# Patient Record
Sex: FEMALE | Race: WHITE | Employment: FULL TIME | URBAN - METROPOLITAN AREA
[De-identification: names, ages, dates, MRNs, and addresses within clinical notes are randomized per-mention and may not be internally consistent; named-entity substitution may affect disease eponyms.]

---

## 2017-01-18 ENCOUNTER — TELEPHONE (OUTPATIENT)
Dept: INTERNAL MEDICINE CLINIC | Facility: CLINIC | Age: 32
End: 2017-01-18

## 2017-01-18 RX ORDER — DOXYCYCLINE HYCLATE 100 MG/1
100 CAPSULE ORAL 2 TIMES DAILY
Qty: 60 CAPSULE | Refills: 2 | Status: SHIPPED | OUTPATIENT
Start: 2017-01-18 | End: 2017-05-10

## 2017-01-18 NOTE — TELEPHONE ENCOUNTER
Pt. Is calling to get a Rx for Doxycycline ph. # [phone]   [facility]       [phone]    Pt.  Is aware Dr. Kenneth Mccollum is off

## 2017-01-18 NOTE — TELEPHONE ENCOUNTER
Patient states her dermatologist has prescribed in the past, but they are not available. Patient takes intermittently--just when her skin breaks out. She believes she takes 100mg BID.  She is ok to wait until Dr. Hassan Blowers back in the office tomorrow if on call not

## 2017-01-18 NOTE — TELEPHONE ENCOUNTER
Imp- acne; Rec- doxycycline 100 mg po BID, #60, 2RF; ERx sent to Carlos Portillo in  [place] at [facility]; please call pt

## 2017-04-24 ENCOUNTER — TELEPHONE (OUTPATIENT)
Dept: INTERNAL MEDICINE CLINIC | Facility: CLINIC | Age: 32
End: 2017-04-24

## 2017-04-24 DIAGNOSIS — N30.00 ACUTE CYSTITIS WITHOUT HEMATURIA: Primary | ICD-10-CM

## 2017-04-24 RX ORDER — NITROFURANTOIN 25; 75 MG/1; MG/1
100 CAPSULE ORAL 2 TIMES DAILY
Qty: 10 CAPSULE | Refills: 0 | Status: SHIPPED | OUTPATIENT
Start: 2017-04-24 | End: 2017-04-29

## 2017-04-25 NOTE — TELEPHONE ENCOUNTER
Please call patient and let her know that the [facility] lab is open to 8 PM  p.m. tonight. ( Note to self:  Pt has sx of UTI with dysuria and frequency x 1 day. Will rx with Macrobid.   I discussed with her getting urine and urine culture prior to

## 2017-04-25 NOTE — TELEPHONE ENCOUNTER
Pt notified that lab orders entered  But pt will not be in town before 8 pm tonight  Will  Just start the macrobid tonight and will check back to  DR LUNA if still having sx after completing meds

## 2017-05-10 ENCOUNTER — OFFICE VISIT (OUTPATIENT)
Dept: INTERNAL MEDICINE CLINIC | Facility: CLINIC | Age: 32
End: 2017-05-10

## 2017-05-10 VITALS
TEMPERATURE: 99 F | WEIGHT: 122 LBS | SYSTOLIC BLOOD PRESSURE: 108 MMHG | DIASTOLIC BLOOD PRESSURE: 82 MMHG | OXYGEN SATURATION: 99 % | HEART RATE: 70 BPM | HEIGHT: 64 IN | BODY MASS INDEX: 20.83 KG/M2

## 2017-05-10 DIAGNOSIS — F32.A DEPRESSION, UNSPECIFIED DEPRESSION TYPE: Primary | ICD-10-CM

## 2017-05-10 PROCEDURE — 99212 OFFICE O/P EST SF 10 MIN: CPT | Performed by: INTERNAL MEDICINE

## 2017-05-10 PROCEDURE — 99213 OFFICE O/P EST LOW 20 MIN: CPT | Performed by: INTERNAL MEDICINE

## 2017-05-10 RX ORDER — VENLAFAXINE HYDROCHLORIDE 37.5 MG/1
CAPSULE, EXTENDED RELEASE ORAL
Qty: 60 CAPSULE | Refills: 0 | Status: SHIPPED | OUTPATIENT
Start: 2017-05-10 | End: 2017-05-11

## 2017-05-10 RX ORDER — CLINDAMYCIN PHOSPHATE AND BENZOYL PEROXIDE 10; 37.5 MG/G; MG/G
1 GEL TOPICAL
Refills: 1 | COMMUNITY
Start: 2017-04-04 | End: 2019-02-08

## 2017-05-10 RX ORDER — AMOXICILLIN 500 MG/1
CAPSULE ORAL
Refills: 1 | COMMUNITY
Start: 2017-04-04 | End: 2017-06-12

## 2017-05-10 NOTE — PROGRESS NOTES
Todd Lin is a 28year old female. Patient presents with:  Referral: Patient requests referral for therpaist. She was previously seeing one but would like to switch   Anxiety: Patient would like to talk about taking a medication for anxiety.  Feels % External Cream Apply 1 Application topically. Couple times per week Disp:  Rfl:    amoxicillin 500 MG Oral Cap TK ONE C PO  BID Disp:  Rfl: 1   ONEXTON 1.2-3.75 % External Gel Apply 1 Application topically.  Couple times per week  Disp:  Rfl: 1   Venlafax developed, well nourished, in no apparent distress  NECK: supple, no carotic bruits, no thyromegaly, no cervical or supraclavicular LAD  LUNGS: clear to auscultation bilaterally, no wheezing or rhonchi  CARDIO: RRR, normal S1S2, no gallops or murmurs

## 2017-05-11 ENCOUNTER — TELEPHONE (OUTPATIENT)
Dept: INTERNAL MEDICINE CLINIC | Facility: CLINIC | Age: 32
End: 2017-05-11

## 2017-05-11 RX ORDER — VENLAFAXINE HYDROCHLORIDE 75 MG/1
75 CAPSULE, EXTENDED RELEASE ORAL DAILY
Qty: 30 CAPSULE | Refills: 0 | Status: SHIPPED | OUTPATIENT
Start: 2017-05-11 | End: 2017-05-18

## 2017-05-11 NOTE — TELEPHONE ENCOUNTER
Spoke with pharmacist. Patient will take 37.5mg daily for 10 days, then will  the 75mg tablets to take daily.     Please notify patient that issue has been resolved, she can  the rx for 75mg daily, and start this after completing the 10 days o

## 2017-05-11 NOTE — TELEPHONE ENCOUNTER
Roel requesting PA Venlafaxine Er 37.5mg cap      On form no ID or number given form in purple folder

## 2017-05-11 NOTE — TELEPHONE ENCOUNTER
[phone]  Pt received 10 pills from pharmacy. She started it today. Pt states she can't stop taking meds once she starts it?  Please advise  To Rx

## 2017-05-11 NOTE — TELEPHONE ENCOUNTER
To ---we could have the pt find out what formulary alternatives are covered as I don't see that she has failed other med and that will be needed for PA process

## 2017-05-11 NOTE — TELEPHONE ENCOUNTER
Spoke with patient. She reports she went to the pharmacy last night to get medication and she was told that it is the 2 tabs daily that insurance was not approving. She is to start with 1 tab daily x 10 days and then increased to 2 tabs daily.  She also men

## 2017-05-12 NOTE — TELEPHONE ENCOUNTER
Relayed MD message to patient, Patient verbalized understanding. Patient will call if she has any further questions.

## 2017-05-17 ENCOUNTER — TELEPHONE (OUTPATIENT)
Dept: INTERNAL MEDICINE CLINIC | Facility: CLINIC | Age: 32
End: 2017-05-17

## 2017-05-17 NOTE — TELEPHONE ENCOUNTER
Recommend trial of Lexapro 10mg po qday. Like Effexor, it may take up to 4 weeks to see the full effect.   And like other anti-anxiety meds, she may notice some transient increase in her anxiety the first week, although not very common

## 2017-05-17 NOTE — TELEPHONE ENCOUNTER
Please advise for DR. LUNA patient - was put on Effexor by DR. DUNAWAY - patient does not tolerate . Was leandro dby DR. KNIGHT over the weekend to stop medication. Patient would like to start something else - to DR. Uribe Lamp

## 2017-05-17 NOTE — TELEPHONE ENCOUNTER
Pt was taken Effexor but she did not like how she felt ob it. On call doc told her to stop and call back Monday  is out this week she is asking if  can put her on something else.  Please advise

## 2017-05-18 RX ORDER — ESCITALOPRAM OXALATE 10 MG/1
10 TABLET ORAL DAILY
Qty: 30 TABLET | Refills: 6 | Status: SHIPPED | OUTPATIENT
Start: 2017-05-18 | End: 2017-05-19 | Stop reason: DRUGHIGH

## 2017-05-18 NOTE — TELEPHONE ENCOUNTER
Patient called back, she is concerned about the 10mg dosage of the Lexapro, patient states she is very sensitive to medications and would like to start by only taking 5mg of the Lexapro daily to see how she does on the lowest dosage.  Patient has not picked

## 2017-05-18 NOTE — TELEPHONE ENCOUNTER
Spoke to patient gave her dr. Saad Zepeda recommendation. Patient agreed to try lexapro sent to her local pharmacy.

## 2017-05-19 RX ORDER — ESCITALOPRAM OXALATE 5 MG/1
5 TABLET ORAL DAILY
COMMUNITY
End: 2017-07-06

## 2017-05-19 NOTE — TELEPHONE ENCOUNTER
Called Obdulia at [facility] cancelled Lexapro 10 mg, called in lexapro 5 mg  #30/ 3. LM relaying MD message to patient ( ok per Hipaa).

## 2017-06-12 ENCOUNTER — OFFICE VISIT (OUTPATIENT)
Dept: INTERNAL MEDICINE CLINIC | Facility: CLINIC | Age: 32
End: 2017-06-12

## 2017-06-12 VITALS
HEIGHT: 64 IN | WEIGHT: 123 LBS | DIASTOLIC BLOOD PRESSURE: 72 MMHG | HEART RATE: 60 BPM | TEMPERATURE: 98 F | SYSTOLIC BLOOD PRESSURE: 110 MMHG | BODY MASS INDEX: 21 KG/M2

## 2017-06-12 DIAGNOSIS — F32.A DEPRESSION, UNSPECIFIED DEPRESSION TYPE: ICD-10-CM

## 2017-06-12 DIAGNOSIS — F41.9 ANXIETY: ICD-10-CM

## 2017-06-12 DIAGNOSIS — R42 DIZZINESS: Primary | ICD-10-CM

## 2017-06-12 PROCEDURE — 99212 OFFICE O/P EST SF 10 MIN: CPT | Performed by: INTERNAL MEDICINE

## 2017-06-12 PROCEDURE — 99213 OFFICE O/P EST LOW 20 MIN: CPT | Performed by: INTERNAL MEDICINE

## 2017-06-13 NOTE — PROGRESS NOTES
Iva Pacheco is a 28year old female. Patient presents with: Follow - Up: switched effexor to lexapro followup also questions about taking ambien with her lexapro.       HPI:   Iva Pacheco is a 28year old female who presents for: follow up o 1   Sumatriptan-Naproxen Sodium  MG Oral Tab 1 tablet at onset of migraine, may repeat in 1 hour; no more than 2 tablets in 24 hours Disp: 18 tablet Rfl: 3   ondansetron 4 MG Oral Tablet Dispersible 1-2 tabs as needed for nausea associated with migra lexapro 5mg daily as long as blood tests are ok; would avoid ambien or take 1/2 tablet only while on lexapro. Also avoid maxalt and instead try ibuprofen or excedrin migraine instead.  Will place referral to [facility] navigator to help find a therapist.

## 2017-06-30 ENCOUNTER — TELEPHONE (OUTPATIENT)
Dept: INTERNAL MEDICINE CLINIC | Facility: CLINIC | Age: 32
End: 2017-06-30

## 2017-06-30 NOTE — TELEPHONE ENCOUNTER
She can try taking 1/2 tablet of lexapro 5mg daily, and let me know if this gets better. If not, we will discuss switching to another medication.

## 2017-06-30 NOTE — TELEPHONE ENCOUNTER
Pt. States she started Lexapro about 1 month ago. She has noticed she is more fatigued. She is asking if her dose should be lowered or change medication? She can be reached at [phone].

## 2017-07-06 ENCOUNTER — OFFICE VISIT (OUTPATIENT)
Dept: INTERNAL MEDICINE CLINIC | Facility: CLINIC | Age: 32
End: 2017-07-06

## 2017-07-06 VITALS
OXYGEN SATURATION: 98 % | DIASTOLIC BLOOD PRESSURE: 88 MMHG | WEIGHT: 124 LBS | SYSTOLIC BLOOD PRESSURE: 110 MMHG | TEMPERATURE: 98 F | BODY MASS INDEX: 21.17 KG/M2 | HEART RATE: 77 BPM | HEIGHT: 64 IN
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
## 2017-07-06 PROCEDURE — 99213 OFFICE O/P EST LOW 20 MIN: CPT | Performed by: INTERNAL MEDICINE

## 2017-07-06 PROCEDURE — 99212 OFFICE O/P EST SF 10 MIN: CPT | Performed by: INTERNAL MEDICINE

## 2017-07-06 RX ORDER — ESCITALOPRAM OXALATE 5 MG/1
5 TABLET ORAL DAILY
Qty: 90 TABLET | Refills: 1 | Status: SHIPPED | OUTPATIENT
Start: 2017-07-06 | End: 2017-10-02 | Stop reason: ALTCHOICE

## 2017-07-06 RX ORDER — DOXYCYCLINE HYCLATE 100 MG/1
100 CAPSULE ORAL 2 TIMES DAILY
Qty: 20 CAPSULE | Refills: 0 | Status: SHIPPED | OUTPATIENT
Start: 2017-07-06 | End: 2017-10-02 | Stop reason: ALTCHOICE

## 2017-07-09 ENCOUNTER — TELEPHONE (OUTPATIENT)
Dept: INTERNAL MEDICINE CLINIC | Facility: CLINIC | Age: 32
End: 2017-07-09

## 2017-07-09 NOTE — TELEPHONE ENCOUNTER
On call phone call Saturday (last evening 7/8/17 at 7 pm)-  Pt called regarding \"last couple days\" has pain and tension in lower neck and upper back. No fall or injury.    A wk ago, she cut back lexapro from 5 mg to 2.5 mg daily b/c was sleepy on 5 mg dos

## 2017-07-26 NOTE — PROGRESS NOTES
Kunal Carr is a 28year old female. Patient presents with:  Bite: Insect bite on left breast has gotten larger over the last week       HPI:   Kunal Carr is a 28year old female who presents for:bug bite    Reports insect bite on L breast papillomavirus infection    • H/O spinal fusion    • Hx of migraines    • Scoliosis       History reviewed. No pertinent surgical history.    Family History   Problem Relation Age of Onset   • Cancer Father      CLL      Social History:   Smoking status: Ne

## 2017-08-09 NOTE — TELEPHONE ENCOUNTER
Refill request for Vanesa Crockett. Appears to have last been entered on 3/10/2015 as historical.     Called patient for clarification. Pt reported that her gynecologist, Dr. Carly Garcia with [facility], normally prescribes this medication for her.  She has called their offic

## 2017-08-10 RX ORDER — DROSPIRENONE AND ETHINYL ESTRADIOL 0.02-3(28)
1 KIT ORAL DAILY
Qty: 3 PACKAGE | Refills: 3 | Status: SHIPPED | OUTPATIENT
Start: 2017-08-10 | End: 2018-08-10

## 2017-09-20 ENCOUNTER — TELEPHONE (OUTPATIENT)
Dept: INTERNAL MEDICINE CLINIC | Facility: CLINIC | Age: 32
End: 2017-09-20

## 2017-09-20 RX ORDER — CIPROFLOXACIN 250 MG/1
250 TABLET, FILM COATED ORAL 2 TIMES DAILY
Qty: 14 TABLET | Refills: 0 | Status: SHIPPED | OUTPATIENT
Start: 2017-09-20 | End: 2017-09-27

## 2017-09-20 NOTE — TELEPHONE ENCOUNTER
Please advise for DR. LUNA patient - called patient who states she has burning when urinating and frequency, denies fever and flank pain - allergy to sulfa - to DR. Juani Greenwood

## 2017-09-20 NOTE — TELEPHONE ENCOUNTER
Pt. Has a UTI & would like a Rx for it  Ph. # [phone]  [facility] ph.  # [phone]   Routed to clinical

## 2017-09-20 NOTE — TELEPHONE ENCOUNTER
Sent in rx for ciprofloxacin bid x 7 days. Take with food. Call if symptoms worsen or fail to improve within 48 hours.

## 2017-10-02 ENCOUNTER — OFFICE VISIT (OUTPATIENT)
Dept: INTERNAL MEDICINE CLINIC | Facility: CLINIC | Age: 32
End: 2017-10-02

## 2017-10-02 VITALS
SYSTOLIC BLOOD PRESSURE: 118 MMHG | WEIGHT: 125 LBS | DIASTOLIC BLOOD PRESSURE: 90 MMHG | BODY MASS INDEX: 21 KG/M2 | HEART RATE: 84 BPM | TEMPERATURE: 99 F | OXYGEN SATURATION: 98 %

## 2017-10-02 DIAGNOSIS — R51.9 NONINTRACTABLE EPISODIC HEADACHE, UNSPECIFIED HEADACHE TYPE: Primary | ICD-10-CM

## 2017-10-02 DIAGNOSIS — R11.0 NAUSEA: ICD-10-CM

## 2017-10-02 PROCEDURE — 99212 OFFICE O/P EST SF 10 MIN: CPT | Performed by: INTERNAL MEDICINE

## 2017-10-02 PROCEDURE — 99214 OFFICE O/P EST MOD 30 MIN: CPT | Performed by: INTERNAL MEDICINE

## 2017-10-02 NOTE — PROGRESS NOTES
HPI:   Julio Melo is a 28year old female presents with the following problems. Patient relates that about 3 weeks ago she started to have headaches. These are not like her migraine headaches. It is a diffuse headache.   This is associated wit Prescriptions:  Drospirenone-Ethinyl Estradiol (VESTURA) 3-0.02 MG Oral Tab Take 1 tablet by mouth daily. Disp: 3 Package Rfl: 3   Tazarotene (TAZORAC) 0.05 % External Cream Apply 1 Application topically. Couple times per week Disp:  Rfl:    ONEXTON 1.2-3. Anion Gap 10 0 - 18   BUN/CREA Ratio 10.7 10.0 - 20.0   Calculated Osmolality 291 275 - 295 mOsm/kg   GFR, Non-African American >60 >=60   GFR, -American >60 >=60   -LIPASE   Result Value Ref Range   Lipase 42 22 - 51 U/L   -CBC W/ DIFFERENTIAL normal  CARDIO:  RRR without murmur. S1 and S2 normal  GI:  good BS's. no masses, organomegaly or tenderness   EXTREMITIES:  no cyanosis, clubbing or edema  NEURO:  Awake and aware.   motor strength symmetric arms and legs,  reflexes 1 + knees  bilaterall

## 2017-10-03 ENCOUNTER — TELEPHONE (OUTPATIENT)
Dept: INTERNAL MEDICINE CLINIC | Facility: CLINIC | Age: 32
End: 2017-10-03

## 2017-10-03 ENCOUNTER — LAB ENCOUNTER (OUTPATIENT)
Dept: LAB | Age: 32
End: 2017-10-03
Attending: INTERNAL MEDICINE
Payer: COMMERCIAL

## 2017-10-03 DIAGNOSIS — R51.9 NONINTRACTABLE EPISODIC HEADACHE, UNSPECIFIED HEADACHE TYPE: ICD-10-CM

## 2017-10-03 DIAGNOSIS — R11.0 NAUSEA: ICD-10-CM

## 2017-10-03 PROCEDURE — 36415 COLL VENOUS BLD VENIPUNCTURE: CPT

## 2017-10-03 PROCEDURE — 80053 COMPREHEN METABOLIC PANEL: CPT

## 2017-10-03 PROCEDURE — 85025 COMPLETE CBC W/AUTO DIFF WBC: CPT

## 2017-10-03 NOTE — TELEPHONE ENCOUNTER
Relayed MD message to patient, Patient verbalized understanding. States she will have lab work completed today.

## 2017-10-03 NOTE — TELEPHONE ENCOUNTER
Please call patient today. I saw her yesterday. I left message for her last evening. I told her we would call today to confirm she received the message.   Please notify her that I did call the [facility] regarding the mothball and naphthalene Post-Care Instructions: I reviewed with the patient in detail post-care instructions. Patient is to wear sunprotection, and avoid picking at any of the treated lesions. Pt may apply Vaseline to crusted or scabbing areas.\\nWHAT TO EXPECT AFTER CRYOSURGERY (LIQUID NITROGEN) TREATMENT\\n\\n\\n Liquid Nitrogen is a very cold gas. In this liquid state it has a temperature of 320 degrees below zero Fahrenheit. Dermatologists use liquid nitrogen to treat certain skin growths such as warts, seborrheic and actinic keratoses, and a whole variety of other skin tumors. These skin growths are destroyed by the freezing action of this agent. With cryosurgery we have the advantage of being able to treat many skin growths and leave very little scarring. \\n\\n From a few hours to a few days after treatment, the area may blister, turn black, or form a scab. This is a desirable result. In some, no reaction is apparent.\\n\\n 1. You are allowed to get the area wet even immediately after treatment.\\n\\n 2. Most person have little or no pain from this treatment. You may have some swelling about the eyes, if cyrotherapy is performed on the forehead or temple.\\n\\n 3. It is not necessary to cover the area with a bandage. In fact, this is undesirable. Things heal better if left open to the air. You should protect the area from injury as much as possible. \\n\\n 4. Painful large blisters (even blisters filled with blood) can occur at times. These can be opened and the fluid drained out to relieve the pain. This may be done with a needle which has been cleaned with alcohol. \\n\\n 5. As the treated area heals the unwanted skin growth will fall off. This will take several days to weeks depending on the size and nature of the growth treated, the location on the skn and the way your body heals. \\n\\n 6. Allow the growth to fall off by itself - don't pick it or pull it off.\\n\\n 7. When the growth does come off, the skin underneath will be somewhat red. As time passes it will assume the color of normal skin. Don't bandage, pick at or apply any medications to the site after the growth has fallen off. The area may be sensitive to touch and be itchy as it heals. This is normal and it may take some time before it is exactly like the skin around it once more. \\n\\n\\n If you have any questions or concerns, please contact our office:         Vin[facility] [phone] Detail Level: Detailed Consent: The patient's consent was obtained including but not limited to risks of crusting, scabbing, blistering, scarring, darker or lighter pigmentary change, recurrence, incomplete removal and infection. Render Post-Care Instructions In Note?: yes Duration Of Freeze Thaw-Cycle (Seconds): 0

## 2017-10-04 ENCOUNTER — TELEPHONE (OUTPATIENT)
Dept: INTERNAL MEDICINE CLINIC | Facility: CLINIC | Age: 32
End: 2017-10-04

## 2017-10-04 DIAGNOSIS — R73.09 ELEVATED RANDOM BLOOD GLUCOSE LEVEL: Primary | ICD-10-CM

## 2017-10-04 NOTE — TELEPHONE ENCOUNTER
Discussed with patient. Chemistries acceptable. Blood sugar a little high but she was nonfasting. I will leave an order for fasting blood sugar that she can get by the end of the year. She did buy a CO2 detector for her home. No abnormalities.   Also t

## 2018-02-01 ENCOUNTER — TELEPHONE (OUTPATIENT)
Dept: INTERNAL MEDICINE CLINIC | Facility: CLINIC | Age: 33
End: 2018-02-01

## 2018-02-01 ENCOUNTER — APPOINTMENT (OUTPATIENT)
Dept: LAB | Age: 33
End: 2018-02-01
Attending: INTERNAL MEDICINE
Payer: COMMERCIAL

## 2018-02-01 DIAGNOSIS — R73.09 ELEVATED RANDOM BLOOD GLUCOSE LEVEL: ICD-10-CM

## 2018-02-01 LAB — GLUCOSE SERPL-MCNC: 85 MG/DL (ref 70–99)

## 2018-02-01 PROCEDURE — 82947 ASSAY GLUCOSE BLOOD QUANT: CPT

## 2018-02-01 PROCEDURE — 36415 COLL VENOUS BLD VENIPUNCTURE: CPT

## 2018-02-01 NOTE — TELEPHONE ENCOUNTER
Please let patient know that her blood sugar came out 85. This is completely normal.  This was for follow-up of a prior blood sugar that was a little bit elevated. This is very reassuring. I do not think any further testing is needed.

## 2018-03-15 NOTE — TELEPHONE ENCOUNTER
Please advise - called patient who states lexapro makes her really drowsy and tired , Effexor was even worse - patient wants to know if there is anything else she can try - to DR. LUNA

## 2018-03-15 NOTE — TELEPHONE ENCOUNTER
I will send referral.  Please asked patient her most dominant mood symptom whether it is more anxiety or depression or combination of both. Thank you.  ( I should have asked this before.)

## 2018-03-15 NOTE — TELEPHONE ENCOUNTER
Spoke with patient and relayed message from Dr. Elisa Nugent. She verbalized understanding and is open to referral to behavioral health. Referral pended.

## 2018-03-15 NOTE — TELEPHONE ENCOUNTER
Please notify patient also the 2 medicines are most familiar with. I can refer her to behavioral health if she agrees and they can help guide her on possible next steps.   I would send them the referral and they would reach out to her and call her first wi

## 2018-03-15 NOTE — TELEPHONE ENCOUNTER
Please call pt  Requesting recommendation for alternative to Lexapro  Pt uses [address] as pharmacy  Tasked to nursing

## 2018-04-09 ENCOUNTER — TELEPHONE (OUTPATIENT)
Dept: INTERNAL MEDICINE CLINIC | Facility: CLINIC | Age: 33
End: 2018-04-09

## 2018-04-09 NOTE — TELEPHONE ENCOUNTER
LMTCB  - LM pm VM that pt should check with insurance to make sure Dr. Kai Reid is in plan. Why is she seeing him?

## 2018-04-09 NOTE — TELEPHONE ENCOUNTER
Needs referral to Dr. Asher Potter. Endocrinologist from [facility]  Please fax to specialist at [phone].     Has appointment in July with specialist  Please call Lorenzo Rondon at [phone]  Routed to clinical

## 2018-05-02 NOTE — TELEPHONE ENCOUNTER
To Dr Emanuel Alcala attempted to reach out to patient x3 since April 9th and have not heard back from her.

## 2018-08-10 ENCOUNTER — TELEPHONE (OUTPATIENT)
Dept: INTERNAL MEDICINE CLINIC | Facility: CLINIC | Age: 33
End: 2018-08-10

## 2018-08-10 ENCOUNTER — OFFICE VISIT (OUTPATIENT)
Dept: INTERNAL MEDICINE CLINIC | Facility: CLINIC | Age: 33
End: 2018-08-10
Payer: COMMERCIAL

## 2018-08-10 VITALS
HEIGHT: 64 IN | OXYGEN SATURATION: 99 % | SYSTOLIC BLOOD PRESSURE: 116 MMHG | BODY MASS INDEX: 21.17 KG/M2 | HEART RATE: 64 BPM | WEIGHT: 124 LBS | TEMPERATURE: 99 F | DIASTOLIC BLOOD PRESSURE: 60 MMHG

## 2018-08-10 DIAGNOSIS — E78.5 HYPERLIPIDEMIA, UNSPECIFIED HYPERLIPIDEMIA TYPE: ICD-10-CM

## 2018-08-10 DIAGNOSIS — Z00.00 ANNUAL PHYSICAL EXAM: Primary | ICD-10-CM

## 2018-08-10 DIAGNOSIS — Z01.419 ENCOUNTER FOR GYNECOLOGICAL EXAMINATION WITHOUT ABNORMAL FINDING: Primary | ICD-10-CM

## 2018-08-10 DIAGNOSIS — E28.2 PCOS (POLYCYSTIC OVARIAN SYNDROME): ICD-10-CM

## 2018-08-10 DIAGNOSIS — R19.8 BOWEL MOVEMENT SYMPTOM: ICD-10-CM

## 2018-08-10 PROCEDURE — 99395 PREV VISIT EST AGE 18-39: CPT | Performed by: INTERNAL MEDICINE

## 2018-08-10 RX ORDER — CHLORAL HYDRATE 500 MG
2 CAPSULE ORAL DAILY
COMMUNITY
End: 2021-08-16

## 2018-08-10 NOTE — TELEPHONE ENCOUNTER
Called patient and gave number and address for DR. Jorge Gabriel ( gyn) [phone] - verbalized understanding

## 2018-08-10 NOTE — PROGRESS NOTES
HPI:   Yoselin Hood is a 33year old female presents with the following problems. Patient has no acute complaints. She is here for annual physical.    She struggles with acne. She is seen dermatology.   She is felt to have polycystic ovary syndr 124 lb (56.2 kg)    Body mass index is 21.28 kg/m². Current Outpatient Prescriptions:  Cholecalciferol (VITAMIN D-3) 5000 units Oral Tab Take 1 tablet by mouth daily. Disp:  Rfl:    Omega-3 1000 MG Oral Cap Take 2 tablets by mouth daily.  Disp:  Rfl: Voices no blurred vision or eye pain  HEENT:  Voices no nasal congestion, sinus pain or sore throat  LUNGS:  Voices no shortness of breath or cough  CARDIOVASCULAR:  Voices no chest pain or palpitations  GI:  Voices no abdominal pain, blood in stool, daniels celiac check. - CBC WITH DIFFERENTIAL WITH PLATELET; Future  - COMP METABOLIC PANEL (14); Future  - LIPID PANEL; Future  - ASSAY, THYROID STIM HORMONE; Future  - FREE T4 (FREE THYROXINE);  Future  - VITAMIN D, 25-HYDROXY; Future  - VITAMIN B12 WITH REFLEX

## 2018-08-10 NOTE — TELEPHONE ENCOUNTER
Please call patient and let her know that I forgot to give her phone number to gynecology. I cannot remember if she got it on the way out. In any case I wanted to refer her to Dr. Alie Soliman. I did place referral in the system.

## 2018-08-29 ENCOUNTER — LAB ENCOUNTER (OUTPATIENT)
Dept: LAB | Age: 33
End: 2018-08-29
Attending: INTERNAL MEDICINE
Payer: COMMERCIAL

## 2018-08-29 DIAGNOSIS — R19.8 BOWEL MOVEMENT SYMPTOM: ICD-10-CM

## 2018-08-29 DIAGNOSIS — Z00.00 ANNUAL PHYSICAL EXAM: ICD-10-CM

## 2018-08-29 DIAGNOSIS — E78.5 HYPERLIPIDEMIA, UNSPECIFIED HYPERLIPIDEMIA TYPE: ICD-10-CM

## 2018-08-29 LAB
25(OH)D3 SERPL-MCNC: 46.8 NG/ML
ALBUMIN SERPL BCP-MCNC: 4.4 G/DL (ref 3.5–4.8)
ALBUMIN/GLOB SERPL: 1.7 {RATIO} (ref 1–2)
ALP SERPL-CCNC: 74 U/L (ref 32–100)
ALT SERPL-CCNC: 16 U/L (ref 14–54)
ANION GAP SERPL CALC-SCNC: 7 MMOL/L (ref 0–18)
AST SERPL-CCNC: 22 U/L (ref 15–41)
BASOPHILS # BLD: 0 K/UL (ref 0–0.2)
BASOPHILS NFR BLD: 1 %
BILIRUB SERPL-MCNC: 0.6 MG/DL (ref 0.3–1.2)
BUN SERPL-MCNC: 8 MG/DL (ref 8–20)
BUN/CREAT SERPL: 11.1 (ref 10–20)
CALCIUM SERPL-MCNC: 9.4 MG/DL (ref 8.5–10.5)
CHLORIDE SERPL-SCNC: 105 MMOL/L (ref 95–110)
CHOLEST SERPL-MCNC: 176 MG/DL (ref 110–200)
CO2 SERPL-SCNC: 24 MMOL/L (ref 22–32)
CREAT SERPL-MCNC: 0.72 MG/DL (ref 0.5–1.5)
EOSINOPHIL # BLD: 0.1 K/UL (ref 0–0.7)
EOSINOPHIL NFR BLD: 2 %
ERYTHROCYTE [DISTWIDTH] IN BLOOD BY AUTOMATED COUNT: 13.7 % (ref 11–15)
GLOBULIN PLAS-MCNC: 2.6 G/DL (ref 2.5–3.7)
GLUCOSE SERPL-MCNC: 89 MG/DL (ref 70–99)
HBA1C MFR BLD: 5 % (ref 4–6)
HCT VFR BLD AUTO: 42.6 % (ref 35–48)
HDLC SERPL-MCNC: 75 MG/DL
HGB BLD-MCNC: 14.1 G/DL (ref 12–16)
IGA SERPL-MCNC: 146 MG/DL (ref 68–378)
LDLC SERPL CALC-MCNC: 95 MG/DL (ref 0–99)
LYMPHOCYTES # BLD: 1.3 K/UL (ref 1–4)
LYMPHOCYTES NFR BLD: 38 %
MCH RBC QN AUTO: 29.6 PG (ref 27–32)
MCHC RBC AUTO-ENTMCNC: 33.2 G/DL (ref 32–37)
MCV RBC AUTO: 89.2 FL (ref 80–100)
MONOCYTES # BLD: 0.3 K/UL (ref 0–1)
MONOCYTES NFR BLD: 8 %
NEUTROPHILS # BLD AUTO: 1.8 K/UL (ref 1.8–7.7)
NEUTROPHILS NFR BLD: 52 %
NONHDLC SERPL-MCNC: 101 MG/DL
OSMOLALITY UR CALC.SUM OF ELEC: 280 MOSM/KG (ref 275–295)
PATIENT FASTING: YES
PLATELET # BLD AUTO: 186 K/UL (ref 140–400)
PMV BLD AUTO: 7.7 FL (ref 7.4–10.3)
POTASSIUM SERPL-SCNC: 3.8 MMOL/L (ref 3.3–5.1)
PROT SERPL-MCNC: 7 G/DL (ref 5.9–8.4)
RBC # BLD AUTO: 4.77 M/UL (ref 3.7–5.4)
SODIUM SERPL-SCNC: 136 MMOL/L (ref 136–144)
T3FREE SERPL-MCNC: 3.36 PG/ML (ref 2.53–4.29)
T4 FREE SERPL-MCNC: 0.86 NG/DL (ref 0.58–1.64)
TRIGL SERPL-MCNC: 31 MG/DL (ref 1–149)
TSH SERPL-ACNC: 2.1 UIU/ML (ref 0.45–5.33)
TTG IGA SER-ACNC: 0.2 U/ML (ref ?–7)
VIT B12 SERPL-MCNC: 291 PG/ML (ref 181–914)
WBC # BLD AUTO: 3.5 K/UL (ref 4–11)

## 2018-08-29 PROCEDURE — 83516 IMMUNOASSAY NONANTIBODY: CPT

## 2018-08-29 PROCEDURE — 80061 LIPID PANEL: CPT

## 2018-08-29 PROCEDURE — 84481 FREE ASSAY (FT-3): CPT

## 2018-08-29 PROCEDURE — 83036 HEMOGLOBIN GLYCOSYLATED A1C: CPT

## 2018-08-29 PROCEDURE — 80053 COMPREHEN METABOLIC PANEL: CPT

## 2018-08-29 PROCEDURE — 84443 ASSAY THYROID STIM HORMONE: CPT

## 2018-08-29 PROCEDURE — 82784 ASSAY IGA/IGD/IGG/IGM EACH: CPT

## 2018-08-29 PROCEDURE — 84439 ASSAY OF FREE THYROXINE: CPT

## 2018-08-29 PROCEDURE — 83921 ORGANIC ACID SINGLE QUANT: CPT

## 2018-08-29 PROCEDURE — 36415 COLL VENOUS BLD VENIPUNCTURE: CPT

## 2018-08-29 PROCEDURE — 82607 VITAMIN B-12: CPT

## 2018-08-29 PROCEDURE — 82306 VITAMIN D 25 HYDROXY: CPT

## 2018-08-29 PROCEDURE — 85025 COMPLETE CBC W/AUTO DIFF WBC: CPT

## 2018-08-30 ENCOUNTER — TELEPHONE (OUTPATIENT)
Dept: INTERNAL MEDICINE CLINIC | Facility: CLINIC | Age: 33
End: 2018-08-30

## 2018-08-30 DIAGNOSIS — D70.8 OTHER NEUTROPENIA (HCC): Primary | ICD-10-CM

## 2018-08-30 NOTE — TELEPHONE ENCOUNTER
Called patient and relayed DR. LUNA message - verbalized understanding.  Copy of results and ordermailed to patients home

## 2018-08-31 LAB — MMA: 0.13 UMOL/L

## 2018-09-27 ENCOUNTER — LAB ENCOUNTER (OUTPATIENT)
Dept: LAB | Age: 33
End: 2018-09-27
Attending: INTERNAL MEDICINE
Payer: COMMERCIAL

## 2018-09-27 DIAGNOSIS — D70.8 OTHER NEUTROPENIA (HCC): ICD-10-CM

## 2018-09-27 LAB
BASOPHILS # BLD: 0 K/UL (ref 0–0.2)
BASOPHILS NFR BLD: 0 %
EOSINOPHIL # BLD: 0.1 K/UL (ref 0–0.7)
EOSINOPHIL NFR BLD: 1 %
ERYTHROCYTE [DISTWIDTH] IN BLOOD BY AUTOMATED COUNT: 13.7 % (ref 11–15)
HCT VFR BLD AUTO: 39.9 % (ref 35–48)
HGB BLD-MCNC: 13.5 G/DL (ref 12–16)
LYMPHOCYTES # BLD: 2.1 K/UL (ref 1–4)
LYMPHOCYTES NFR BLD: 37 %
MCH RBC QN AUTO: 29.9 PG (ref 27–32)
MCHC RBC AUTO-ENTMCNC: 33.9 G/DL (ref 32–37)
MCV RBC AUTO: 88.1 FL (ref 80–100)
MONOCYTES # BLD: 0.5 K/UL (ref 0–1)
MONOCYTES NFR BLD: 9 %
NEUTROPHILS # BLD AUTO: 2.9 K/UL (ref 1.8–7.7)
NEUTROPHILS NFR BLD: 53 %
PLATELET # BLD AUTO: 224 K/UL (ref 140–400)
PMV BLD AUTO: 8.5 FL (ref 7.4–10.3)
RBC # BLD AUTO: 4.52 M/UL (ref 3.7–5.4)
WBC # BLD AUTO: 5.5 K/UL (ref 4–11)

## 2018-09-27 PROCEDURE — 36415 COLL VENOUS BLD VENIPUNCTURE: CPT

## 2018-09-27 PROCEDURE — 85025 COMPLETE CBC W/AUTO DIFF WBC: CPT

## 2018-09-28 ENCOUNTER — TELEPHONE (OUTPATIENT)
Dept: INTERNAL MEDICINE CLINIC | Facility: CLINIC | Age: 33
End: 2018-09-28

## 2018-10-26 NOTE — TELEPHONE ENCOUNTER
Patient called to ask if Dr. Osorio Shall could put in a new referral to [facility] for her since she has new insurance now. Referral pended.

## 2018-10-30 ENCOUNTER — TELEPHONE (OUTPATIENT)
Dept: INTERNAL MEDICINE CLINIC | Facility: CLINIC | Age: 33
End: 2018-10-30

## 2018-10-30 RX ORDER — DOXYCYCLINE HYCLATE 100 MG/1
100 CAPSULE ORAL 2 TIMES DAILY
Qty: 20 CAPSULE | Refills: 0 | Status: SHIPPED | OUTPATIENT
Start: 2018-10-30 | End: 2018-11-06

## 2018-10-30 NOTE — TELEPHONE ENCOUNTER
Ok to call in doxycycline 100mg bid x 10 days; please make sure she is not pregnant (this is not safe in pregnancy). Also, if this does not improve in a few days, I would ask her to come see me.

## 2018-10-30 NOTE — TELEPHONE ENCOUNTER
I spoke with patient. She confirmed that she is not pregnant, she started her cycle today. [facility] She will call back to schedule an appointment if things are not improved in a few days.

## 2018-10-30 NOTE — TELEPHONE ENCOUNTER
Patient called because she has had a bug bite on her ankle that is not healing well. She said she had a similar looking bite and Dr. Stanislav Correia gave her something in the past that worked well. To Dr. Stanislav Correia.

## 2018-11-02 ENCOUNTER — HOSPITAL ENCOUNTER (OUTPATIENT)
Age: 33
Discharge: HOME | End: 2018-11-02
Attending: EMERGENCY MEDICINE
Payer: COMMERCIAL

## 2018-11-02 ENCOUNTER — TELEPHONE (OUTPATIENT)
Dept: INTERNAL MEDICINE CLINIC | Facility: CLINIC | Age: 33
End: 2018-11-02

## 2018-11-02 VITALS
OXYGEN SATURATION: 100 % | TEMPERATURE: 98 F | HEART RATE: 65 BPM | HEIGHT: 64 IN | DIASTOLIC BLOOD PRESSURE: 75 MMHG | BODY MASS INDEX: 20.49 KG/M2 | RESPIRATION RATE: 18 BRPM | WEIGHT: 120 LBS | SYSTOLIC BLOOD PRESSURE: 113 MMHG
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
## 2018-11-02 PROCEDURE — 99203 OFFICE O/P NEW LOW 30 MIN: CPT

## 2018-11-02 PROCEDURE — 99213 OFFICE O/P EST LOW 20 MIN: CPT

## 2018-11-02 RX ORDER — AMOXICILLIN AND CLAVULANATE POTASSIUM 875; 125 MG/1; MG/1
1 TABLET, FILM COATED ORAL 2 TIMES DAILY
Qty: 20 TABLET | Refills: 0 | Status: SHIPPED | OUTPATIENT
Start: 2018-11-02 | End: 2018-11-12

## 2018-11-02 RX ORDER — AMOXICILLIN AND CLAVULANATE POTASSIUM 875; 125 MG/1; MG/1
1 TABLET, FILM COATED ORAL 2 TIMES DAILY
Qty: 20 TABLET | Refills: 0 | Status: SHIPPED | OUTPATIENT
Start: 2018-11-02 | End: 2018-11-06

## 2018-11-03 NOTE — ED INITIAL ASSESSMENT (HPI)
Left ankle redness for 1 week, possible insect bite, pt called pmd and was given doxycycline, no fever, no drainage

## 2018-11-03 NOTE — ED PROVIDER NOTES
Patient Seen in: [address]    History   Patient presents with:  Rash Skin Problem (integumentary)    Stated Complaint: left ankle     HPI    This patient complains of a swollen skin lesion which is tender to the left ank the medial aspect over  the ankle that is 5 cm x 5 cm in size. There is a central pinpoint darkened area there is no expressible purulent drainage. There is no lymphangitis. The patient has intact pulses in the leg.   The patient can move the foot withou

## 2018-11-03 NOTE — TELEPHONE ENCOUNTER
On-call phone call Friday evening–[phone]. Bug bite near ankle. Getting worse. Main part is size of half dollar--was size of dime earlier this wk. Taking doxycycline since Tuesday (3 days ago). I recom pt go to [facility] care.   Pt expressed unders

## 2018-11-06 ENCOUNTER — OFFICE VISIT (OUTPATIENT)
Dept: INTERNAL MEDICINE CLINIC | Facility: CLINIC | Age: 33
End: 2018-11-06
Payer: COMMERCIAL

## 2018-11-06 ENCOUNTER — TELEPHONE (OUTPATIENT)
Dept: INTERNAL MEDICINE CLINIC | Facility: CLINIC | Age: 33
End: 2018-11-06

## 2018-11-06 VITALS
SYSTOLIC BLOOD PRESSURE: 110 MMHG | OXYGEN SATURATION: 99 % | WEIGHT: 124 LBS | BODY MASS INDEX: 21.17 KG/M2 | HEART RATE: 72 BPM | DIASTOLIC BLOOD PRESSURE: 88 MMHG | TEMPERATURE: 98 F | HEIGHT: 64 IN
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
## 2018-11-06 PROCEDURE — 99212 OFFICE O/P EST SF 10 MIN: CPT | Performed by: INTERNAL MEDICINE

## 2018-11-06 PROCEDURE — 99214 OFFICE O/P EST MOD 30 MIN: CPT | Performed by: INTERNAL MEDICINE

## 2018-11-06 NOTE — TELEPHONE ENCOUNTER
Patient called because she would like to be seen later today. Patient went to immediate care Friday night and was started on Augmentin for a bug bite on her leg. She says the area looks worse to her. It is red and swollen.  Dr. Rashmi Contreras, can you add patient to

## 2018-11-06 NOTE — TELEPHONE ENCOUNTER
Could she do around 1:00 - I'm trying to get out a little early today. Or Dr Summer Murdock - just has 13 pts today. Let me know. I'm happy to see her at 6:00 if that's the only time that works for her.

## 2018-11-06 NOTE — TELEPHONE ENCOUNTER
HIMANSHU with Dr. Megan Engel message. Requested call back with patient's thoughts. Called patient again to f/u. She reports she cannot make it to an earlier appt, but will try to make it here by 530 pm ayah to see Dr. Shyanne Marie. Scheduled in [facility].      To

## 2018-11-07 NOTE — PROGRESS NOTES
Jefry Lacy is a 33year old female. HPI:   She has had an unexplained sore just above the medial left ankle for the last 2 weeks. She attributes this to a bug bite but no direct evidence of this.  She was not camping, etc and no evidence for tick bi Take 1 tablet (5 mg total) by mouth nightly as needed for Sleep. Disp:  Rfl: 0   Albuterol Sulfate HFA (PROAIR HFA) 108 (90 BASE) MCG/ACT Inhalation Aero Soln Inhale 2 puffs into the lungs every 6 (six) hours as needed for Wheezing.  Disp: 1 Inhaler Rfl: 1 encounter  - suspected but not proven and infected with pus expressable. - AEROBIC BACTERIAL CULTURE; Future  - ANAEROBIC CULTURE; Future  - ANAEROBIC CULTURE  - AEROBIC BACTERIAL CULTURE    Continue Augmentin pending results of culture.  I want to rech

## 2018-11-15 ENCOUNTER — TELEPHONE (OUTPATIENT)
Dept: INTERNAL MEDICINE CLINIC | Facility: CLINIC | Age: 33
End: 2018-11-15

## 2018-11-15 NOTE — TELEPHONE ENCOUNTER
Please call pt, she has finished the medication that was prescribed for bug bite, area still red  Please call to discuss/advise, pt can be reached at [phone]  Tasked to nursing

## 2018-11-15 NOTE — TELEPHONE ENCOUNTER
To Dr. Cristal Duff - see below - sx have improved since drainage - swelling and pain decreased - redness is still size of dime. Surprised at how big it so for so long. Pt just switched insurance - it costs more to come for visit. Should she try another abx? Pt asking for result of culture which is negative - pt states she was on abx when culture taken - is it accurat?

## 2018-11-15 NOTE — TELEPHONE ENCOUNTER
Message noted. I think I  should see that before prescribing another antibiotic. I can see tomorrow if she wishes. I can make her Dr. visit a no charge as a courtesy but I am not sure how the hospital may bill her visit. We can ask Jet Barraza if we make her office visit a no charge if the provider based billing is also a no charge.

## 2018-11-16 ENCOUNTER — OFFICE VISIT (OUTPATIENT)
Dept: INTERNAL MEDICINE CLINIC | Facility: CLINIC | Age: 33
End: 2018-11-16
Payer: COMMERCIAL

## 2018-11-16 VITALS
WEIGHT: 125 LBS | SYSTOLIC BLOOD PRESSURE: 106 MMHG | DIASTOLIC BLOOD PRESSURE: 78 MMHG | HEIGHT: 64 IN | TEMPERATURE: 98 F | HEART RATE: 68 BPM | BODY MASS INDEX: 21.34 KG/M2

## 2018-11-16 DIAGNOSIS — T14.8XXA WOUND OF SKIN: Primary | ICD-10-CM

## 2018-11-16 RX ORDER — DOXYCYCLINE HYCLATE 100 MG/1
100 CAPSULE ORAL 2 TIMES DAILY
Qty: 14 CAPSULE | Refills: 0 | Status: SHIPPED | OUTPATIENT
Start: 2018-11-16 | End: 2019-02-08

## 2018-11-16 NOTE — PROGRESS NOTES
Deb Vargas is a 33year old female. HPI:   Patient presents with:  Wound: wound on left inner leg - non healing - was on antibiotics       For about 3 weeks or a little bit longer patient has had a left lower extremity medial leg wound.   She went t 5000 units Oral Tab Take 1 tablet by mouth daily. Disp:  Rfl:    Omega-3 1000 MG Oral Cap Take 2 tablets by mouth daily. Disp:  Rfl:    Tazarotene (TAZORAC) 0.05 % External Cream Apply 1 Application topically.  Couple times per week Disp:  Rfl:    ONEXTON 1 developed, well nourished, in no apparent distress  SKIN:  no rashes , no suspicious lesions  EYES:  PERRL. Sclera anicteric. LUNGS:  clear to auscultation. Effort normal  CARDIO:  RRR without murmur.    S1 and S2 normal  GI:  good BS's,  no masses,   EXT

## 2018-11-16 NOTE — TELEPHONE ENCOUNTER
S/w ilsa who states that as long as the physician doesn't put a charge for the visit the patient will not be billed. S/w patient who is agreeable to come in to see Dr. Nazanin Woodall (no charge)    To  to call tomorrow and schedule.

## 2018-11-16 NOTE — TELEPHONE ENCOUNTER
Sierra Luque is coming in today to see Dr. Jo Scott at 4 pm today. An appt has been scheduled with Dr. Pete Singh (Plastics) for 915am on Monday morning, Dr. Osorio Shall will discuss at todays visit.

## 2018-11-19 ENCOUNTER — OFFICE VISIT (OUTPATIENT)
Dept: SURGERY | Facility: CLINIC | Age: 33
End: 2018-11-19
Payer: COMMERCIAL

## 2018-11-19 ENCOUNTER — TELEPHONE (OUTPATIENT)
Dept: INTERNAL MEDICINE CLINIC | Facility: CLINIC | Age: 33
End: 2018-11-19

## 2018-11-19 DIAGNOSIS — S81.802A UNSPECIFIED OPEN WOUND, LEFT LOWER LEG, INITIAL ENCOUNTER: Primary | ICD-10-CM

## 2018-11-19 PROCEDURE — 99212 OFFICE O/P EST SF 10 MIN: CPT | Performed by: PLASTIC SURGERY

## 2018-11-19 PROCEDURE — 99243 OFF/OP CNSLTJ NEW/EST LOW 30: CPT | Performed by: PLASTIC SURGERY

## 2018-11-19 NOTE — PROGRESS NOTES
Per Dr. Lakeshia White applied polysporin to Left medial wound and covered with band aid vertically.

## 2018-11-20 NOTE — TELEPHONE ENCOUNTER
Called and Relayed MD's message to patient---verbalized understanding  Patient states Dr. Tristan Fisher instructed her to wait 2-3 weeks. She is to call his office if the area still looks pink. He will then refer her to derm.  Patient states she has a dermatol

## 2018-11-20 NOTE — TELEPHONE ENCOUNTER
Please call patient and let her know that I was able to see Dr. Rudolph Francois his report. I know she is started on some Polysporin ointment. She is to continue her doxycycline. Please ask her to call Friday and let me know how she is doing.   We can then de

## 2018-11-27 NOTE — TELEPHONE ENCOUNTER
To Dr. Antonio Jeronimo - pt has been using polysporin. Wound has been decreasing in size and is improving - \"definitely getting better\". Just using topical BID and \"I am very relieved\". Pt thanks you so much for seeing her before the holiday.

## 2019-01-07 ENCOUNTER — TELEPHONE (OUTPATIENT)
Dept: INTERNAL MEDICINE CLINIC | Facility: CLINIC | Age: 34
End: 2019-01-07

## 2019-01-07 DIAGNOSIS — L97.901: Primary | ICD-10-CM

## 2019-01-07 NOTE — TELEPHONE ENCOUNTER
Pt requesting a referral to dermatologist for her leg. Dr. Queenie Chávez recommended it.   Tasked to nursing

## 2019-02-04 ENCOUNTER — OFFICE VISIT (OUTPATIENT)
Dept: DERMATOLOGY CLINIC | Facility: CLINIC | Age: 34
End: 2019-02-04
Payer: COMMERCIAL

## 2019-02-04 DIAGNOSIS — L70.0 ACNE VULGARIS: Primary | ICD-10-CM

## 2019-02-04 DIAGNOSIS — L30.8 PSORIASIFORM DERMATITIS: ICD-10-CM

## 2019-02-04 PROCEDURE — 99212 OFFICE O/P EST SF 10 MIN: CPT | Performed by: DERMATOLOGY

## 2019-02-04 PROCEDURE — 99203 OFFICE O/P NEW LOW 30 MIN: CPT | Performed by: DERMATOLOGY

## 2019-02-04 RX ORDER — SPIRONOLACTONE 25 MG/1
25 TABLET ORAL 2 TIMES DAILY
Qty: 60 TABLET | Refills: 3 | Status: SHIPPED | OUTPATIENT
Start: 2019-02-04 | End: 2019-07-23

## 2019-02-04 RX ORDER — TRETINOIN 0.4 MG/G
GEL TOPICAL
Qty: 20 G | Refills: 2 | Status: SHIPPED | OUTPATIENT
Start: 2019-02-04 | End: 2021-01-25

## 2019-02-04 RX ORDER — CLOTRIMAZOLE AND BETAMETHASONE DIPROPIONATE 10; .64 MG/G; MG/G
CREAM TOPICAL
Qty: 45 G | Refills: 2 | Status: SHIPPED | OUTPATIENT
Start: 2019-02-04

## 2019-02-04 RX ORDER — CLINDAMYCIN AND BENZOYL PEROXIDE 10; 50 MG/G; MG/G
1 GEL TOPICAL 2 TIMES DAILY
Qty: 50 G | Refills: 12 | Status: SHIPPED | OUTPATIENT
Start: 2019-02-04 | End: 2019-03-06

## 2019-02-07 ENCOUNTER — TELEPHONE (OUTPATIENT)
Dept: DERMATOLOGY CLINIC | Facility: CLINIC | Age: 34
End: 2019-02-07

## 2019-02-07 RX ORDER — ALBUTEROL SULFATE 90 UG/1
2 AEROSOL, METERED RESPIRATORY (INHALATION) EVERY 6 HOURS PRN
Qty: 1 INHALER | Refills: 1 | Status: SHIPPED | OUTPATIENT
Start: 2019-02-07 | End: 2020-03-12

## 2019-02-07 NOTE — TELEPHONE ENCOUNTER
bisi Sewell pended for your review. Last sent in 2015. Andrés Joseph has a cold that started yesterday. Thank you.

## 2019-02-07 NOTE — TELEPHONE ENCOUNTER
Pt requesting refill for:  Albuterol  Previous inhaler has   Pt uses [address] as pharmacy  Tasked to Odalis Zarate

## 2019-02-07 NOTE — TELEPHONE ENCOUNTER
ramon in [place] [facility] [employer] [facility] ressend all 4 rx;s from Monday as [facility] not [facility].  pls advise when sent

## 2019-02-08 ENCOUNTER — OFFICE VISIT (OUTPATIENT)
Dept: INTERNAL MEDICINE CLINIC | Facility: CLINIC | Age: 34
End: 2019-02-08
Payer: COMMERCIAL

## 2019-02-08 VITALS
TEMPERATURE: 98 F | HEIGHT: 64 IN | BODY MASS INDEX: 21.14 KG/M2 | WEIGHT: 123.81 LBS | OXYGEN SATURATION: 98 % | RESPIRATION RATE: 12 BRPM | SYSTOLIC BLOOD PRESSURE: 102 MMHG | DIASTOLIC BLOOD PRESSURE: 68 MMHG | HEART RATE: 92 BPM

## 2019-02-08 DIAGNOSIS — J06.9 ACUTE UPPER RESPIRATORY INFECTION, UNSPECIFIED: Primary | ICD-10-CM

## 2019-02-08 DIAGNOSIS — R68.89 THROAT SYMPTOM: ICD-10-CM

## 2019-02-08 DIAGNOSIS — M41.9 SCOLIOSIS, UNSPECIFIED SCOLIOSIS TYPE, UNSPECIFIED SPINAL REGION: ICD-10-CM

## 2019-02-08 PROCEDURE — 99212 OFFICE O/P EST SF 10 MIN: CPT | Performed by: INTERNAL MEDICINE

## 2019-02-08 PROCEDURE — 99213 OFFICE O/P EST LOW 20 MIN: CPT | Performed by: INTERNAL MEDICINE

## 2019-02-08 RX ORDER — AZITHROMYCIN 250 MG/1
TABLET, FILM COATED ORAL
Qty: 6 TABLET | Refills: 0 | Status: SHIPPED | OUTPATIENT
Start: 2019-02-08 | End: 2019-07-23 | Stop reason: ALTCHOICE

## 2019-02-08 NOTE — PROGRESS NOTES
Brian August is a 29year old female. HPI:   Patient presents with:  Cough: States wet cough non-productive, difficulty breathing, unknown if has fever, pain with cough, body aches since 2/5/19       As per nursing documentation.   Starting February 5 Rfl:    Omega-3 1000 MG Oral Cap Take 2 tablets by mouth daily.  Disp:  Rfl:    Sumatriptan-Naproxen Sodium  MG Oral Tab 1 tablet at onset of migraine, may repeat in 1 hour; no more than 2 tablets in 24 hours Disp: 18 tablet Rfl: 3   ondansetron 4 MG cyanosis, clubbing or edema    Peak flow about 300 x 2. With her age and height she should be around 475. ASSESSMENT AND PLAN:     1. Acute upper respiratory infection, unspecified  Patient appears to have an acute upper respiratory infection.   For [facility]

## 2019-02-17 NOTE — PROGRESS NOTES
Darrius Shaffer is a 29year old female. Patient presents with:  Lesion: Pt presenting with lesion to L ankle. Pt states noticed 4/5 months prior. c/o non-healing and soreness.  Previously took doxycycline and augmentin for treatment and currently usin Tab Take 1 tablet by mouth every 6 (six) hours as needed for congestion.  Disp:  Rfl: 0      Past Medical History:   Diagnosis Date   • H/O human papillomavirus infection    • H/O spinal fusion    • Hx of migraines    • Scoliosis       Social History:  Soci RASH    Past Medical History:   Diagnosis Date   • H/O human papillomavirus infection    • H/O spinal fusion    • Hx of migraines    • Scoliosis      History reviewed. No pertinent surgical history.   Social History    Socioeconomic History      Marital Special Diet: Not Asked        Back Care: Not Asked        Exercise: Yes          walking twice a day        Bike Helmet: Not Asked        Seat Belt: Not Asked        Self-Exams: Not Asked        Left Handed: Not Asked        Right Handed: Yes        Cu nourished,oriented to person, place, time, and situation,in no apparent distress  HEENT: atraumatic, normocephalic  NECK: supple,no adenopathy,  EXTREMITIES: no cyanosis, clubbing or edema    SKIN:  multiple inflammatory papules,   nodules  , prominent atr Moisturizer. Monitor over the next several months. Anticipate will take 2-3 months to see any sort of difference in postinflammatory changes    If stable RTC 3-4 months or p.r.n. No orders of the defined types were placed in this encounter.       Meds

## 2019-02-22 ENCOUNTER — TELEPHONE (OUTPATIENT)
Dept: DERMATOLOGY CLINIC | Facility: CLINIC | Age: 34
End: 2019-02-22

## 2019-02-22 RX ORDER — CLINDAMYCIN AND BENZOYL PEROXIDE 10; 50 MG/G; MG/G
1 GEL TOPICAL 2 TIMES DAILY
Qty: 50 G | Refills: 12 | Status: SHIPPED | OUTPATIENT
Start: 2019-02-22 | End: 2020-12-31

## 2019-02-22 NOTE — TELEPHONE ENCOUNTER
Pt is agreeable to have prescriptions sent to [facility]. Rx sent. Pt will call back if costs are still to high to try the other options listed below.

## 2019-02-22 NOTE — TELEPHONE ENCOUNTER
LMTCB. Benzaclin can be sent to [facility] order pharmacy. Dr. Urias People, may we change retin-a prescription to Retin-a Micro 0.06% and send to [facility] [place] as well? Or another alternative?

## 2019-02-22 NOTE — TELEPHONE ENCOUNTER
Clindamycin Phos-Benzoyl Perox 1-5 % External Gel  Tretinoin Microsphere (RETIN-A MICRO PUMP) 0.04 % External Gel  Pt states medications are too expensive. Are there alternatives? Or cheaper medications?

## 2019-02-22 NOTE — TELEPHONE ENCOUNTER
Ok to change retin-a to the 0.06% and send both to [facility] [place] please let pt know.   ASPN would be alternative for Duac instead of the genreic benzaclin if this is still too much at [facility]--or [facility]ip[facility] at [facility] another alternative for the Benzaclin pended rx's  P

## 2019-07-23 ENCOUNTER — HOSPITAL ENCOUNTER (OUTPATIENT)
Dept: ULTRASOUND IMAGING | Facility: HOSPITAL | Age: 34
Discharge: HOME | End: 2019-07-23
Attending: INTERNAL MEDICINE
Payer: COMMERCIAL

## 2019-07-23 ENCOUNTER — OFFICE VISIT (OUTPATIENT)
Dept: INTERNAL MEDICINE CLINIC | Facility: CLINIC | Age: 34
End: 2019-07-23
Payer: COMMERCIAL

## 2019-07-23 ENCOUNTER — LAB ENCOUNTER (OUTPATIENT)
Dept: LAB | Facility: HOSPITAL | Age: 34
End: 2019-07-23
Attending: INTERNAL MEDICINE
Payer: COMMERCIAL

## 2019-07-23 VITALS
HEART RATE: 71 BPM | HEIGHT: 64 IN | WEIGHT: 121 LBS | SYSTOLIC BLOOD PRESSURE: 100 MMHG | OXYGEN SATURATION: 98 % | DIASTOLIC BLOOD PRESSURE: 70 MMHG | BODY MASS INDEX: 20.66 KG/M2 | TEMPERATURE: 99 F | RESPIRATION RATE: 20 BRPM

## 2019-07-23 DIAGNOSIS — R10.2 PELVIC PAIN IN FEMALE: ICD-10-CM

## 2019-07-23 DIAGNOSIS — R10.2 PELVIC PAIN IN FEMALE: Primary | ICD-10-CM

## 2019-07-23 LAB
ALBUMIN SERPL-MCNC: 4.2 G/DL (ref 3.4–5)
ALBUMIN/GLOB SERPL: 1.2 {RATIO} (ref 1–2)
ALP LIVER SERPL-CCNC: 70 U/L (ref 37–98)
ALT SERPL-CCNC: 14 U/L (ref 13–56)
ANION GAP SERPL CALC-SCNC: 6 MMOL/L (ref 0–18)
AST SERPL-CCNC: 11 U/L (ref 15–37)
B-HCG SERPL-ACNC: <1 MIU/ML
BASOPHILS # BLD AUTO: 0.04 X10(3) UL (ref 0–0.2)
BASOPHILS NFR BLD AUTO: 0.7 %
BILIRUB SERPL-MCNC: 0.5 MG/DL (ref 0.1–2)
BUN BLD-MCNC: 11 MG/DL (ref 7–18)
BUN/CREAT SERPL: 11.7 (ref 10–20)
CALCIUM BLD-MCNC: 8.9 MG/DL (ref 8.5–10.1)
CHLORIDE SERPL-SCNC: 108 MMOL/L (ref 98–112)
CO2 SERPL-SCNC: 27 MMOL/L (ref 21–32)
CREAT BLD-MCNC: 0.94 MG/DL (ref 0.55–1.02)
DEPRECATED RDW RBC AUTO: 38.8 FL (ref 35.1–46.3)
EOSINOPHIL # BLD AUTO: 0.08 X10(3) UL (ref 0–0.7)
EOSINOPHIL NFR BLD AUTO: 1.4 %
ERYTHROCYTE [DISTWIDTH] IN BLOOD BY AUTOMATED COUNT: 12.1 % (ref 11–15)
GLOBULIN PLAS-MCNC: 3.4 G/DL (ref 2.8–4.4)
GLUCOSE BLD-MCNC: 83 MG/DL (ref 70–99)
HCT VFR BLD AUTO: 41.5 % (ref 35–48)
HGB BLD-MCNC: 13.9 G/DL (ref 12–16)
IMM GRANULOCYTES # BLD AUTO: 0.01 X10(3) UL (ref 0–1)
IMM GRANULOCYTES NFR BLD: 0.2 %
LYMPHOCYTES # BLD AUTO: 2.6 X10(3) UL (ref 1–4)
LYMPHOCYTES NFR BLD AUTO: 46.5 %
M PROTEIN MFR SERPL ELPH: 7.6 G/DL (ref 6.4–8.2)
MCH RBC QN AUTO: 29.8 PG (ref 26–34)
MCHC RBC AUTO-ENTMCNC: 33.5 G/DL (ref 31–37)
MCV RBC AUTO: 88.9 FL (ref 80–100)
MONOCYTES # BLD AUTO: 0.43 X10(3) UL (ref 0.1–1)
MONOCYTES NFR BLD AUTO: 7.7 %
NEUTROPHILS # BLD AUTO: 2.43 X10 (3) UL (ref 1.5–7.7)
NEUTROPHILS # BLD AUTO: 2.43 X10(3) UL (ref 1.5–7.7)
NEUTROPHILS NFR BLD AUTO: 43.5 %
OSMOLALITY SERPL CALC.SUM OF ELEC: 291 MOSM/KG (ref 275–295)
PATIENT FASTING: YES
PLATELET # BLD AUTO: 214 10(3)UL (ref 150–450)
POTASSIUM SERPL-SCNC: 3.9 MMOL/L (ref 3.5–5.1)
RBC # BLD AUTO: 4.67 X10(6)UL (ref 3.8–5.3)
SODIUM SERPL-SCNC: 141 MMOL/L (ref 136–145)
TSI SER-ACNC: 1.63 MIU/ML (ref 0.36–3.74)
WBC # BLD AUTO: 5.6 X10(3) UL (ref 4–11)

## 2019-07-23 PROCEDURE — 93975 VASCULAR STUDY: CPT | Performed by: INTERNAL MEDICINE

## 2019-07-23 PROCEDURE — 76830 TRANSVAGINAL US NON-OB: CPT | Performed by: INTERNAL MEDICINE

## 2019-07-23 PROCEDURE — 84443 ASSAY THYROID STIM HORMONE: CPT

## 2019-07-23 PROCEDURE — 84702 CHORIONIC GONADOTROPIN TEST: CPT

## 2019-07-23 PROCEDURE — 99214 OFFICE O/P EST MOD 30 MIN: CPT | Performed by: INTERNAL MEDICINE

## 2019-07-23 PROCEDURE — 85025 COMPLETE CBC W/AUTO DIFF WBC: CPT

## 2019-07-23 PROCEDURE — 36415 COLL VENOUS BLD VENIPUNCTURE: CPT

## 2019-07-23 PROCEDURE — 76856 US EXAM PELVIC COMPLETE: CPT | Performed by: INTERNAL MEDICINE

## 2019-07-23 PROCEDURE — 80053 COMPREHEN METABOLIC PANEL: CPT

## 2019-07-23 RX ORDER — QUETIAPINE 25 MG/1
25 TABLET, FILM COATED ORAL NIGHTLY
Refills: 0 | COMMUNITY
Start: 2019-07-23 | End: 2020-07-30

## 2019-07-23 NOTE — PROGRESS NOTES
Brian August is a 29year old female. HPI:   Patient presents with:  Physical: Annual  Pain:  abdomen pain lower right quad comes and goes since friday     For 4 days patient has been having some right low pelvic pain.   Not really in the appendix are more than 2 tablets in 24 hours Disp: 18 tablet Rfl: 3   ondansetron 4 MG Oral Tablet Dispersible 1-2 tabs as needed for nausea associated with migraine; no more than 6 tabs in 24 hours Disp: 30 tablet Rfl: 1   Pseudoephedrine HCl (SUDAFED) 30 MG Oral Tab not done. EXTREMITIES : no cyanosis, clubbing or edema    ASSESSMENT AND PLAN:     1. Pelvic pain in female  Will go ahead and get pelvic ultrasound. Will check labs. Beta hCG.  CBC. Further recommendation to follow.   - CBC WITH DIFFERENTIAL WITH PLATE

## 2019-07-25 ENCOUNTER — TELEPHONE (OUTPATIENT)
Dept: INTERNAL MEDICINE CLINIC | Facility: CLINIC | Age: 34
End: 2019-07-25

## 2019-07-25 NOTE — TELEPHONE ENCOUNTER
To Dr. Anish Pritchett - pt states pain is about the same as yesterday - instructed pt to call if pain changes/increases, understanding verbalized.

## 2019-07-26 NOTE — TELEPHONE ENCOUNTER
Discussed with Layman Joseph. She still has the lower pelvic pain. There are no symptoms to suggest anything like  appendicitis. No fevers or chills. She did have a little bit of nausea. A little bit of gas after she ate something. Some bloating.   She got

## 2019-07-30 NOTE — TELEPHONE ENCOUNTER
Discussed with Pool Pierce. She is having some extra gas. Abdominal bloating. This usually happens after eating. Some constipation. Did take MiraLAX with some relief. She still has some lower pelvic area pain.   She had a follow-up with me Thursday but I

## 2019-07-30 NOTE — TELEPHONE ENCOUNTER
Patient will be keeping her appointment on Thursday, as she is still experiencing the pain. Wanted Dr. Grayce Eisenmenger to know.       Patient can be reached at:  [phone]

## 2019-07-31 ENCOUNTER — OFFICE VISIT (OUTPATIENT)
Dept: INTERNAL MEDICINE CLINIC | Facility: CLINIC | Age: 34
End: 2019-07-31
Payer: COMMERCIAL

## 2019-07-31 ENCOUNTER — TELEPHONE (OUTPATIENT)
Dept: INTERNAL MEDICINE CLINIC | Facility: CLINIC | Age: 34
End: 2019-07-31

## 2019-07-31 ENCOUNTER — APPOINTMENT (OUTPATIENT)
Dept: LAB | Facility: HOSPITAL | Age: 34
End: 2019-07-31
Attending: INTERNAL MEDICINE
Payer: COMMERCIAL

## 2019-07-31 ENCOUNTER — HOSPITAL ENCOUNTER (OUTPATIENT)
Dept: CT IMAGING | Facility: HOSPITAL | Age: 34
Discharge: HOME | End: 2019-07-31
Attending: INTERNAL MEDICINE
Payer: COMMERCIAL

## 2019-07-31 VITALS
HEIGHT: 64 IN | OXYGEN SATURATION: 98 % | WEIGHT: 120 LBS | TEMPERATURE: 99 F | RESPIRATION RATE: 16 BRPM | BODY MASS INDEX: 20.49 KG/M2 | DIASTOLIC BLOOD PRESSURE: 60 MMHG | SYSTOLIC BLOOD PRESSURE: 96 MMHG | HEART RATE: 67 BPM

## 2019-07-31 DIAGNOSIS — R10.31 CONTINUOUS RLQ ABDOMINAL PAIN: Primary | ICD-10-CM

## 2019-07-31 DIAGNOSIS — R30.0 DYSURIA: Primary | ICD-10-CM

## 2019-07-31 DIAGNOSIS — R10.33 PERIUMBILICAL PAIN: ICD-10-CM

## 2019-07-31 DIAGNOSIS — R30.0 DYSURIA: ICD-10-CM

## 2019-07-31 DIAGNOSIS — R10.31 CONTINUOUS RLQ ABDOMINAL PAIN: ICD-10-CM

## 2019-07-31 LAB
BILIRUB UR QL: NEGATIVE
CLARITY UR: CLEAR
COLOR UR: COLORLESS
GLUCOSE UR-MCNC: NEGATIVE MG/DL
LEUKOCYTE ESTERASE UR QL STRIP.AUTO: NEGATIVE
NITRITE UR QL STRIP.AUTO: NEGATIVE
PH UR: 7 [PH] (ref 5–8)
PROT UR-MCNC: NEGATIVE MG/DL
RBC #/AREA URNS AUTO: 0 /HPF
SP GR UR STRIP: 1 (ref 1–1.03)
UROBILINOGEN UR STRIP-ACNC: <2
VIT C UR-MCNC: NEGATIVE MG/DL
WBC #/AREA URNS AUTO: <1 /HPF

## 2019-07-31 PROCEDURE — 99213 OFFICE O/P EST LOW 20 MIN: CPT | Performed by: INTERNAL MEDICINE

## 2019-07-31 PROCEDURE — 74176 CT ABD & PELVIS W/O CONTRAST: CPT | Performed by: INTERNAL MEDICINE

## 2019-07-31 PROCEDURE — 81001 URINALYSIS AUTO W/SCOPE: CPT

## 2019-07-31 PROCEDURE — 87086 URINE CULTURE/COLONY COUNT: CPT

## 2019-07-31 NOTE — TELEPHONE ENCOUNTER
Please notify patient that her urine looked ok, just that she is dehydrated. Would really encouraged her to push more fluids. I will call her if her culture shows up positive. I would not pursue anything further at this time.

## 2019-07-31 NOTE — TELEPHONE ENCOUNTER
Discussed CT results with patient; will have her take miralax x 3 days, then take a fiber supplement daily. Also discussed bladder thickening-she has no symptoms; will check UA/culture.

## 2019-07-31 NOTE — TELEPHONE ENCOUNTER
Pt is calling to speak to Dr William Zabala, she has a question regarding instructions, [phone]     Tasked to nursing

## 2019-07-31 NOTE — PROGRESS NOTES
Lauryn Kim is a 29year old female. Patient presents with:  Abdominal Pain: Patient continues with lower abdominal pain. Last office visit with Dr Francine Wilkins on 7/23/19 and is present for evaluation if CAT is needed.  Pt states pain seems like it's gettin ondansetron 4 MG Oral Tablet Dispersible 1-2 tabs as needed for nausea associated with migraine; no more than 6 tabs in 24 hours Disp: 30 tablet Rfl: 1   Pseudoephedrine HCl (SUDAFED) 30 MG Oral Tab Take 1 tablet by mouth every 6 (six) hours as needed fo

## 2019-07-31 NOTE — TELEPHONE ENCOUNTER
Patient called back - she was wondering if the contrast made her dehydrated , her urine looks clear - RN explained that she should just drink water as usual nd the urine was fine so far - verbalized understanding

## 2019-08-01 ENCOUNTER — TELEPHONE (OUTPATIENT)
Dept: INTERNAL MEDICINE CLINIC | Facility: CLINIC | Age: 34
End: 2019-08-01

## 2019-08-01 NOTE — TELEPHONE ENCOUNTER
Pt called back looking for results and stated she has another question. Would not give anymore information than that.

## 2019-08-03 ENCOUNTER — TELEPHONE (OUTPATIENT)
Dept: INTERNAL MEDICINE CLINIC | Facility: CLINIC | Age: 34
End: 2019-08-03

## 2019-08-03 NOTE — TELEPHONE ENCOUNTER
Paged this morning regarding pain and bowel movements. Feels bloated after eating and has not has much success with miralax. Having small bowel movements. Could consider stopping miralax and starting benefiber, or taking both to see if sx improve.

## 2019-08-05 ENCOUNTER — TELEPHONE (OUTPATIENT)
Dept: INTERNAL MEDICINE CLINIC | Facility: CLINIC | Age: 34
End: 2019-08-05

## 2019-08-05 NOTE — TELEPHONE ENCOUNTER
Discussed with Layman Joseph. Reviewed symptoms. Patient still has \"pinching\" pain right pelvic area. She is wondering about a hernia. I told her I thought this was a good thought. I can have her see surgery.   I do not think she has a surgical abdomen but

## 2019-08-06 ENCOUNTER — OFFICE VISIT (OUTPATIENT)
Dept: INTERNAL MEDICINE CLINIC | Facility: CLINIC | Age: 34
End: 2019-08-06
Payer: COMMERCIAL

## 2019-08-06 VITALS
OXYGEN SATURATION: 98 % | HEART RATE: 61 BPM | TEMPERATURE: 98 F | RESPIRATION RATE: 14 BRPM | WEIGHT: 120 LBS | SYSTOLIC BLOOD PRESSURE: 110 MMHG | DIASTOLIC BLOOD PRESSURE: 72 MMHG | BODY MASS INDEX: 21 KG/M2

## 2019-08-06 DIAGNOSIS — K59.00 CONSTIPATION, UNSPECIFIED CONSTIPATION TYPE: ICD-10-CM

## 2019-08-06 DIAGNOSIS — R10.2 PELVIC PAIN: Primary | ICD-10-CM

## 2019-08-06 PROCEDURE — 99213 OFFICE O/P EST LOW 20 MIN: CPT | Performed by: INTERNAL MEDICINE

## 2019-08-06 RX ORDER — GARLIC EXTRACT 500 MG
1 CAPSULE ORAL DAILY
COMMUNITY
End: 2020-07-30

## 2019-08-06 NOTE — TELEPHONE ENCOUNTER
Pt is calling she is still having the same pinching pain in the abdomin  She scheduled an jarrett today at 4 with Dr Lamar Freitas, should she keep this or get a referral for a surgeon  Please call pt [phone]

## 2019-08-06 NOTE — PROGRESS NOTES
Jackie Leon is a 29year old female. HPI:   Patient presents with:  Abdominal Pain: Follow up on RLQ abdominal pain. Reports pain is pinching in nature and has had trouble working the past 2 days.       Patient continues to have right low pelvic pa MICRO PUMP) 0.06 % External Gel Apply 1 Application topically daily. Disp: 50 g Rfl: 11   Albuterol Sulfate HFA (PROAIR HFA) 108 (90 Base) MCG/ACT Inhalation Aero Soln Inhale 2 puffs into the lungs every 6 (six) hours as needed for Wheezing.  Disp: 1 Inhale in no apparent distress  SKIN:  no rashes , no suspicious lesions right groin area. HEENT: atraumatic. Pharynx normal without exudate. EYES:  PERRL. Sclera anicteric. NECK:  Supple,  no adenopathy,   LUNGS:  clear to auscultation.   Effort normal  CARDI

## 2019-08-12 NOTE — TELEPHONE ENCOUNTER
I would be careful with the dog having worms in the stool. Not likely that this is the cause of her issues, but would recommend keeping an eye on her stools just to be safe.

## 2019-08-12 NOTE — TELEPHONE ENCOUNTER
Per 8/6 OV note: Pelvic pain  Patient has persistent pelvic pain. Its been going on for couple weeks. Work-up so far has been negative. Pain is described as pinching and worse with sitting.   This may be either a soft tissue type pain or pinched nerve fr

## 2019-08-12 NOTE — TELEPHONE ENCOUNTER
Spoke to patient and relayed MD message and instructions, patient verbalizes understanding and agrees with instructions. Patent reports she would just like to recheck that her dog having worms in her stool is not any concern to her health.  She states s

## 2019-08-12 NOTE — TELEPHONE ENCOUNTER
Patient is using Benefiber but still having localized pain. She is also doing the Advil that Dr. Mandy Resendiz recommended. Patient is not seeing any improvement.       Patient can be reached at:  [phone]

## 2019-08-12 NOTE — TELEPHONE ENCOUNTER
Called and Relayed MD's message to patient---verbalized understanding.  She will monitor her stool's

## 2019-08-12 NOTE — TELEPHONE ENCOUNTER
As she is moving her bowels normally; this could be related to her back; would try heating pad and stretches to her lower back to see if helps. I would continue the advil (400mg up to 3 times daily as needed to see if this help).    Would consider trying a

## 2019-08-20 ENCOUNTER — TELEPHONE (OUTPATIENT)
Dept: INTERNAL MEDICINE CLINIC | Facility: CLINIC | Age: 34
End: 2019-08-20

## 2019-08-22 ENCOUNTER — TELEPHONE (OUTPATIENT)
Dept: INTERNAL MEDICINE CLINIC | Facility: CLINIC | Age: 34
End: 2019-08-22

## 2019-08-22 NOTE — TELEPHONE ENCOUNTER
Pt called as per Dr Sanders Aid request, to provide update on abdominal pain  Little improved but still there intermittently  Tasked to nursing

## 2019-08-26 NOTE — TELEPHONE ENCOUNTER
Discussed with Nima Hairston. Reviewed symptoms. Patient continues to have a pinching area in her right groin. She did see her gynecologist and the gynecologist was wondering about her hip or her bladder. However patient does not have any acute urological symptoms. She may urinate frequently but she drinks a lot of water. She has no new symptoms. No blood in the urine. No dysuria. Also with her hip it does not seem like she has any hip problem in itself. The pinching pain in the groin is worse with sitting and driving and better with walking. She has no difficulty with hip movement getting into the car. We will still wonder about her hernia. With that in mind she agrees to see Dr. Homar Ballard. This way he can review her physical exam findings and make recommendations onto possible causes for this persistent pain. She has tried nonsteroidal anti-inflammatories that have not helped. She had a unremarkable GYN exam.  ( Tere Patricia.  Miah Bear )

## 2019-09-01 ENCOUNTER — HOSPITAL ENCOUNTER (EMERGENCY)
Facility: HOSPITAL | Age: 34
Discharge: HOME | End: 2019-09-01
Attending: EMERGENCY MEDICINE
Payer: COMMERCIAL

## 2019-09-01 VITALS
RESPIRATION RATE: 17 BRPM | BODY MASS INDEX: 20.83 KG/M2 | DIASTOLIC BLOOD PRESSURE: 80 MMHG | OXYGEN SATURATION: 98 % | HEIGHT: 64 IN | TEMPERATURE: 98 F | WEIGHT: 122 LBS | SYSTOLIC BLOOD PRESSURE: 107 MMHG | HEART RATE: 81 BPM

## 2019-09-01 DIAGNOSIS — R10.2 PELVIC PAIN IN FEMALE: Primary | ICD-10-CM

## 2019-09-01 LAB
ANION GAP SERPL CALC-SCNC: 5 MMOL/L (ref 0–18)
B-HCG UR QL: NEGATIVE
BACTERIA UR QL AUTO: NEGATIVE /HPF
BASOPHILS # BLD AUTO: 0.03 X10(3) UL (ref 0–0.2)
BASOPHILS NFR BLD AUTO: 0.3 %
BILIRUB UR QL: NEGATIVE
BUN BLD-MCNC: 9 MG/DL (ref 7–18)
BUN/CREAT SERPL: 11 (ref 10–20)
CALCIUM BLD-MCNC: 8.8 MG/DL (ref 8.5–10.1)
CHLORIDE SERPL-SCNC: 106 MMOL/L (ref 98–112)
CLARITY UR: CLEAR
CO2 SERPL-SCNC: 30 MMOL/L (ref 21–32)
COLOR UR: COLORLESS
CREAT BLD-MCNC: 0.82 MG/DL (ref 0.55–1.02)
DEPRECATED RDW RBC AUTO: 38 FL (ref 35.1–46.3)
EOSINOPHIL # BLD AUTO: 0.05 X10(3) UL (ref 0–0.7)
EOSINOPHIL NFR BLD AUTO: 0.6 %
ERYTHROCYTE [DISTWIDTH] IN BLOOD BY AUTOMATED COUNT: 11.9 % (ref 11–15)
GLUCOSE BLD-MCNC: 104 MG/DL (ref 70–99)
GLUCOSE UR-MCNC: NEGATIVE MG/DL
HCT VFR BLD AUTO: 40.8 % (ref 35–48)
HGB BLD-MCNC: 14 G/DL (ref 12–16)
IMM GRANULOCYTES # BLD AUTO: 0.02 X10(3) UL (ref 0–1)
IMM GRANULOCYTES NFR BLD: 0.2 %
KETONES UR-MCNC: NEGATIVE MG/DL
LEUKOCYTE ESTERASE UR QL STRIP.AUTO: NEGATIVE
LYMPHOCYTES # BLD AUTO: 2.51 X10(3) UL (ref 1–4)
LYMPHOCYTES NFR BLD AUTO: 28.7 %
MCH RBC QN AUTO: 30.3 PG (ref 26–34)
MCHC RBC AUTO-ENTMCNC: 34.3 G/DL (ref 31–37)
MCV RBC AUTO: 88.3 FL (ref 80–100)
MONOCYTES # BLD AUTO: 0.41 X10(3) UL (ref 0.1–1)
MONOCYTES NFR BLD AUTO: 4.7 %
NEUTROPHILS # BLD AUTO: 5.73 X10 (3) UL (ref 1.5–7.7)
NEUTROPHILS # BLD AUTO: 5.73 X10(3) UL (ref 1.5–7.7)
NEUTROPHILS NFR BLD AUTO: 65.5 %
NITRITE UR QL STRIP.AUTO: NEGATIVE
OSMOLALITY SERPL CALC.SUM OF ELEC: 291 MOSM/KG (ref 275–295)
PH UR: 7 [PH] (ref 5–8)
PLATELET # BLD AUTO: 198 10(3)UL (ref 150–450)
POTASSIUM SERPL-SCNC: 3.3 MMOL/L (ref 3.5–5.1)
PROT UR-MCNC: NEGATIVE MG/DL
RBC # BLD AUTO: 4.62 X10(6)UL (ref 3.8–5.3)
RBC #/AREA URNS AUTO: <1 /HPF
SODIUM SERPL-SCNC: 141 MMOL/L (ref 136–145)
SP GR UR STRIP: 1 (ref 1–1.03)
UROBILINOGEN UR STRIP-ACNC: <2
VIT C UR-MCNC: NEGATIVE MG/DL
WBC # BLD AUTO: 8.8 X10(3) UL (ref 4–11)
WBC #/AREA URNS AUTO: <1 /HPF

## 2019-09-01 PROCEDURE — 81001 URINALYSIS AUTO W/SCOPE: CPT | Performed by: EMERGENCY MEDICINE

## 2019-09-01 PROCEDURE — 85025 COMPLETE CBC W/AUTO DIFF WBC: CPT | Performed by: EMERGENCY MEDICINE

## 2019-09-01 PROCEDURE — 80048 BASIC METABOLIC PNL TOTAL CA: CPT | Performed by: EMERGENCY MEDICINE

## 2019-09-01 PROCEDURE — 99283 EMERGENCY DEPT VISIT LOW MDM: CPT

## 2019-09-01 PROCEDURE — 81025 URINE PREGNANCY TEST: CPT

## 2019-09-01 PROCEDURE — 36415 COLL VENOUS BLD VENIPUNCTURE: CPT

## 2019-09-01 NOTE — ED INITIAL ASSESSMENT (HPI)
Pt c/o RLQ abd pain x6 weeks. She states she had US and CTs already done, which were negative, and has followed up with MD. Denies N/V/D.

## 2019-09-02 NOTE — ED PROVIDER NOTES
Patient Seen in: [facility] AND [facility] Emergency Department    History   Patient presents with:  Abdomen/Flank Pain (GI/)      HPI    Patient presents to the ED complaining of ongoing right pelvic pain for the past 6 weeks.   She has had several visits to h Resp 18   Temp 98.1 °F (36.7 °C)   Temp src Oral   SpO2 98 %   O2 Device None (Room air)       Physical Exam   Constitutional: She is oriented to person, place, and time. She appears well-developed and well-nourished. No distress.    HENT:   Head: Normoce individual orders.    RAINBOW DRAW BLUE   RAINBOW DRAW LAVENDER   RAINBOW DRAW LIGHT GREEN   RAINBOW DRAW GOLD   CBC W/ DIFFERENTIAL         Imaging Results Available and Reviewed while in ED:     ED Medications Administered: Medications - No data to displa

## 2019-09-02 NOTE — ED NOTES
Discharge instructions given to pt. Pt verbalized understanding of home care, pain control, and to follow up with surgical referral provided. Pt denied further questions or concerns. Pt ambulatory out of ED, discharged in stable condition.

## 2019-09-03 ENCOUNTER — PATIENT MESSAGE (OUTPATIENT)
Dept: INTERNAL MEDICINE CLINIC | Facility: CLINIC | Age: 34
End: 2019-09-03

## 2019-09-03 ENCOUNTER — OFFICE VISIT (OUTPATIENT)
Dept: INTERNAL MEDICINE CLINIC | Facility: CLINIC | Age: 34
End: 2019-09-03
Payer: COMMERCIAL

## 2019-09-03 ENCOUNTER — TELEPHONE (OUTPATIENT)
Dept: INTERNAL MEDICINE CLINIC | Facility: CLINIC | Age: 34
End: 2019-09-03

## 2019-09-03 VITALS
HEART RATE: 80 BPM | WEIGHT: 123 LBS | OXYGEN SATURATION: 99 % | DIASTOLIC BLOOD PRESSURE: 70 MMHG | TEMPERATURE: 99 F | BODY MASS INDEX: 21 KG/M2 | SYSTOLIC BLOOD PRESSURE: 98 MMHG | HEIGHT: 64 IN

## 2019-09-03 DIAGNOSIS — G89.29 CHRONIC RLQ PAIN: Primary | ICD-10-CM

## 2019-09-03 DIAGNOSIS — R10.31 CHRONIC RLQ PAIN: Primary | ICD-10-CM

## 2019-09-03 DIAGNOSIS — R10.31 RIGHT GROIN PAIN: ICD-10-CM

## 2019-09-03 PROCEDURE — 99213 OFFICE O/P EST LOW 20 MIN: CPT | Performed by: INTERNAL MEDICINE

## 2019-09-03 NOTE — TELEPHONE ENCOUNTER
From: Raya Adams  To: Kd Grey MD  Sent: 9/3/2019 11:28 AM CDT  Subject: Visit Follow-up Question    Hi Dr. Francine Wilkins,    I went to the ER on Saturday because my symptoms got worse over the weekend - I'm having pain now when I lie on my side, e

## 2019-09-03 NOTE — PROGRESS NOTES
Lee Chang is a 29year old female. HPI:   Patient presents with:  Pain: RLQ Abdominal Pain \"intermittent\". Pain has not improved over last 6 weeks. ER visit 9/1 for this. Pain now affecting sleep as well.       Patient continues to have right i Inhalation Aero Soln Inhale 2 puffs into the lungs every 6 (six) hours as needed for Wheezing.  Disp: 1 Inhaler Rfl: 1   Tretinoin Microsphere (RETIN-A MICRO PUMP) 0.04 % External Gel Use qhs for acne Disp: 20 g Rfl: 2   clotrimazole-betamethasone 1-0.05 % tenderness in the right groin area but I do not feel hernia. Her right lower quadrant abdominal exam is benign. No rebound or peritoneal signs. EXTREMITIES : no cyanosis, clubbing or edema    ASSESSMENT AND PLAN:     1.  Chronic RLQ pain  Right lower adriel

## 2019-09-04 PROBLEM — R10.31 RIGHT GROIN PAIN: Status: ACTIVE | Noted: 2019-09-04

## 2019-09-10 ENCOUNTER — TELEPHONE (OUTPATIENT)
Dept: INTERNAL MEDICINE CLINIC | Facility: CLINIC | Age: 34
End: 2019-09-10

## 2019-09-10 NOTE — TELEPHONE ENCOUNTER
Discussed with Luciano Ledesma. She will be seeing a spine surgeon to see if there is any relationship to her right inguinal \"nerve like\" pain. She will hold off on seeing uro-gynecologist for now. She will keep me informed of results of her consultations.   Ryan Loveless

## 2019-10-29 ENCOUNTER — OFFICE VISIT (OUTPATIENT)
Dept: UROLOGY | Facility: HOSPITAL | Age: 34
End: 2019-10-29
Attending: OBSTETRICS & GYNECOLOGY
Payer: COMMERCIAL

## 2019-10-29 VITALS
DIASTOLIC BLOOD PRESSURE: 60 MMHG | HEIGHT: 64 IN | BODY MASS INDEX: 21 KG/M2 | SYSTOLIC BLOOD PRESSURE: 102 MMHG | WEIGHT: 123 LBS

## 2019-10-29 DIAGNOSIS — R39.15 URINARY URGENCY: ICD-10-CM

## 2019-10-29 DIAGNOSIS — N81.84 PELVIC MUSCLE WASTING: Primary | ICD-10-CM

## 2019-10-29 DIAGNOSIS — R35.0 URINARY FREQUENCY: ICD-10-CM

## 2019-10-29 DIAGNOSIS — R10.9 ABDOMINAL WALL PAIN: ICD-10-CM

## 2019-10-29 PROCEDURE — 99202 OFFICE O/P NEW SF 15 MIN: CPT

## 2019-10-29 PROCEDURE — 87086 URINE CULTURE/COLONY COUNT: CPT | Performed by: OBSTETRICS & GYNECOLOGY

## 2019-10-29 NOTE — PATIENT INSTRUCTIONS
[facility]    TIMED VOIDING    For women experiencing urinary incontinence from overactive bladder or pelvic weakness, retraining the bladder is of great importance.   The bladder responds very well to this te suck on a hard candy or take small sips of water if you become thirsty instead of drinking large amounts. Diuretic medications such as high blood pressure medicine should be taken during the daytime if possible.     When you are able to hold your urine wit

## 2019-10-29 NOTE — PROGRESS NOTES
Simona Corey,   10/29/2019     Referred by Dr. Fredy Hwang  Pt here with self    She c/o right inguinal nerve pain x3 months, some improvement  Worse with sitting, improved while lying down    HPI:  Rare GAURAV  No UUI, some urinary urgency & frequency  Win Sodium  MG Oral Tab, 1 tablet at onset of migraine, may repeat in 1 hour; no more than 2 tablets in 24 hours, Disp: 18 tablet, Rfl: 3  ondansetron 4 MG Oral Tablet Dispersible, 1-2 tabs as needed for nausea associated with migraine; no more than 6 ta modification, discussed pharmacologic and nonpharmacologic mgmt options for urinary symptoms. Discussed dietary & weight management with potential improvements in symptoms with weight loss.     Diagnostic Items:  urine testing    Medications Discussed:  OAB

## 2020-01-29 ENCOUNTER — TELEPHONE (OUTPATIENT)
Dept: UROLOGY | Facility: HOSPITAL | Age: 35
End: 2020-01-29

## 2020-03-12 ENCOUNTER — TELEPHONE (OUTPATIENT)
Dept: INTERNAL MEDICINE CLINIC | Facility: CLINIC | Age: 35
End: 2020-03-12

## 2020-03-12 RX ORDER — ALBUTEROL SULFATE 90 UG/1
2 AEROSOL, METERED RESPIRATORY (INHALATION) EVERY 6 HOURS PRN
Qty: 1 INHALER | Refills: 11 | Status: SHIPPED | OUTPATIENT
Start: 2020-03-12

## 2020-03-16 ENCOUNTER — TELEPHONE (OUTPATIENT)
Dept: INTERNAL MEDICINE CLINIC | Facility: CLINIC | Age: 35
End: 2020-03-16

## 2020-03-16 NOTE — TELEPHONE ENCOUNTER
Discussed with Jazmyne Camargo. Will treat with Zyrtec and flonase. If worse we can use Zpack or short course of prednisone.

## 2020-03-16 NOTE — TELEPHONE ENCOUNTER
Please call pt  Has been having some post nasal drip, bit of a headache (possible congestion), bit of a sore throat, took Advil and Sudafed  Picked up inhaler Dr Bijan Lucio had prescribed, Pt was feeling a little bit of tightness, she has this sometimes when she has a cold  Please call pt to discuss if she is doing the right things  Tasked to nursing

## 2020-03-16 NOTE — TELEPHONE ENCOUNTER
Please advise - called patient who states sh ehas runny nose , postnasal drip, little bit of sore throat, she uses the inhaler she got but feels like she can,t really take a deep breath , feels burning in the chest, denies fever , did not travel , doesn't, know of any contact with anyone testing positive with COVID 19  - to DR. LUNA

## 2020-07-30 ENCOUNTER — OFFICE VISIT (OUTPATIENT)
Dept: INTERNAL MEDICINE CLINIC | Facility: CLINIC | Age: 35
End: 2020-07-30
Payer: COMMERCIAL

## 2020-07-30 VITALS
WEIGHT: 123 LBS | OXYGEN SATURATION: 98 % | HEART RATE: 80 BPM | BODY MASS INDEX: 21 KG/M2 | HEIGHT: 64 IN | SYSTOLIC BLOOD PRESSURE: 94 MMHG | DIASTOLIC BLOOD PRESSURE: 66 MMHG | TEMPERATURE: 98 F | RESPIRATION RATE: 16 BRPM

## 2020-07-30 DIAGNOSIS — R07.89 BURNING CHEST PAIN: ICD-10-CM

## 2020-07-30 DIAGNOSIS — G47.9 SLEEP DISORDER: ICD-10-CM

## 2020-07-30 DIAGNOSIS — J30.9 ALLERGIC RHINITIS, UNSPECIFIED SEASONALITY, UNSPECIFIED TRIGGER: Primary | ICD-10-CM

## 2020-07-30 DIAGNOSIS — F43.9 STRESS: ICD-10-CM

## 2020-07-30 DIAGNOSIS — E87.6 HYPOKALEMIA: ICD-10-CM

## 2020-07-30 DIAGNOSIS — R09.82 POSTNASAL DRIP: ICD-10-CM

## 2020-07-30 PROCEDURE — 99213 OFFICE O/P EST LOW 20 MIN: CPT | Performed by: INTERNAL MEDICINE

## 2020-07-30 PROCEDURE — 3078F DIAST BP <80 MM HG: CPT | Performed by: INTERNAL MEDICINE

## 2020-07-30 PROCEDURE — 3008F BODY MASS INDEX DOCD: CPT | Performed by: INTERNAL MEDICINE

## 2020-07-30 PROCEDURE — 3074F SYST BP LT 130 MM HG: CPT | Performed by: INTERNAL MEDICINE

## 2020-07-30 RX ORDER — ZOLPIDEM TARTRATE 5 MG/1
5 TABLET ORAL NIGHTLY PRN
Qty: 20 TABLET | Refills: 1 | Status: SHIPPED | OUTPATIENT
Start: 2020-07-30

## 2020-07-30 NOTE — PROGRESS NOTES
Lee Chang is a 36year old female. HPI:   Patient presents with:  Ear Problem: Patient c/o bilateral ear congestion, post nasal, wheezing, a little burning to chest, throat felt closing, and green/yellow mucus from nose for past 10 days.  Pt has puffs into the lungs every 6 (six) hours as needed for Wheezing. 1 Inhaler 11   • Tretinoin Microsphere (RETIN-A MICRO PUMP) 0.06 % External Gel Apply 1 Application topically daily.  50 g 11   • Tretinoin Microsphere (RETIN-A MICRO PUMP) 0.04 % External Gel lesions  HEENT: atraumatic. TM's normal. Canals clear. Pharynx normal without exudate. EYES:  PERRL. Sclera anicteric. NECK:  Supple,  no adenopathy,  thyroid normal  LUNGS:  clear to auscultation. Effort normal  CARDIO:  RRR without murmur.    S1 and

## 2020-11-16 ENCOUNTER — IMMUNIZATION (OUTPATIENT)
Dept: INTERNAL MEDICINE CLINIC | Facility: CLINIC | Age: 35
End: 2020-11-16
Payer: COMMERCIAL

## 2020-11-16 DIAGNOSIS — Z23 NEED FOR VACCINATION: ICD-10-CM

## 2020-11-16 PROCEDURE — 90471 IMMUNIZATION ADMIN: CPT | Performed by: INTERNAL MEDICINE

## 2020-11-16 PROCEDURE — 90686 IIV4 VACC NO PRSV 0.5 ML IM: CPT | Performed by: INTERNAL MEDICINE

## 2020-12-30 ENCOUNTER — TELEPHONE (OUTPATIENT)
Dept: INTERNAL MEDICINE CLINIC | Facility: CLINIC | Age: 35
End: 2020-12-30

## 2020-12-30 NOTE — TELEPHONE ENCOUNTER
Medications prescribed by derm Dr. Daniel Wright.      Mychart sent to patient to request refills from Dr. Sanchez Resides

## 2020-12-30 NOTE — TELEPHONE ENCOUNTER
Pt called, requesting refills for:  Retin-A  Clindamycin  Pt uses [facility] for these refills  Tasked to [employer]

## 2020-12-30 NOTE — TELEPHONE ENCOUNTER
Patient called    Asking for refill of Clindamycin Phos-Benzoyl Perox 1-5 % External Gel     LOV 2/4/2019

## 2020-12-31 RX ORDER — TRETINOIN 0.6 MG/G
1 GEL TOPICAL DAILY
Qty: 50 G | Refills: 0 | Status: SHIPPED | OUTPATIENT
Start: 2020-12-31

## 2020-12-31 RX ORDER — CLINDAMYCIN AND BENZOYL PEROXIDE 10; 50 MG/G; MG/G
1 GEL TOPICAL 2 TIMES DAILY
Qty: 50 G | Refills: 0 | Status: SHIPPED | OUTPATIENT
Start: 2020-12-31 | End: 2021-01-30

## 2021-01-25 ENCOUNTER — PATIENT MESSAGE (OUTPATIENT)
Dept: DERMATOLOGY CLINIC | Facility: CLINIC | Age: 36
End: 2021-01-25

## 2021-01-25 ENCOUNTER — VIRTUAL PHONE E/M (OUTPATIENT)
Dept: DERMATOLOGY CLINIC | Facility: CLINIC | Age: 36
End: 2021-01-25
Payer: COMMERCIAL

## 2021-01-25 DIAGNOSIS — L70.0 ACNE VULGARIS: Primary | ICD-10-CM

## 2021-01-25 PROCEDURE — 99212 OFFICE O/P EST SF 10 MIN: CPT | Performed by: DERMATOLOGY

## 2021-01-25 RX ORDER — AZELAIC ACID 0.15 G/G
GEL TOPICAL
Qty: 50 G | Refills: 6 | Status: SHIPPED | OUTPATIENT
Start: 2021-01-25

## 2021-01-25 NOTE — TELEPHONE ENCOUNTER
From: Raya Adams  To: Penelope Ha MD  Sent: 1/25/2021 1:54 PM CST  Subject: Non-Urgent Medical Question    Here are photos for our call. thanks!

## 2021-01-25 NOTE — PROGRESS NOTES
Virtual Telephone Check-In    Antoinette Schofield verbally consents to a Virtual/Telephone Check-In visit on 02/06/21. Patient has been referred to the [facility] website at [email or web address] to review the yearly Consent to Treat document.     Evaristo Simon zolpidem (AMBIEN) 5 MG Oral Tab Take 1 tablet (5 mg total) by mouth nightly as needed for Sleep.  20 tablet 1   • Albuterol Sulfate HFA (PROAIR HFA) 108 (90 Base) MCG/ACT Inhalation Aero Soln Inhale 2 puffs into the lungs every 6 (six) hours as needed for W Wheezing. 1 Inhaler 11   • Omega-3 1000 MG Oral Cap Take 2 tablets by mouth daily.      • clotrimazole-betamethasone 1-0.05 % External Cream Use bid to affected areas of skin (Patient not taking: Reported on 1/25/2021 ) 45 g 2   • Sumatriptan-Naproxen Sodiu Gets together: Not on file        Attends Yazidi service: Not on file        Active member of club or organization: Not on file        Attends meetings of clubs or organizations: Not on file        Relationship status: Not on file      Intimate partner changes scarring. Still more active areas on nose with redness. Does use Retin-A on spots BenzaClin on more active areas. Patient has been on new vaginal ring for her PCOS. Overall this has been reasonably stable. Nothing is really new or different. General skin care questions answered. Reassurance regarding benign skin lesions. Signs and symptoms of skin cancer, ABCDE's of melanoma briefly reviewed. Sunscreen use, sun protection, encouraged. Followup as noted in rtc or p.r.n.     The patient in

## 2021-04-01 ENCOUNTER — TELEPHONE (OUTPATIENT)
Dept: INTERNAL MEDICINE CLINIC | Facility: CLINIC | Age: 36
End: 2021-04-01

## 2021-08-16 ENCOUNTER — OFFICE VISIT (OUTPATIENT)
Dept: INTERNAL MEDICINE CLINIC | Facility: CLINIC | Age: 36
End: 2021-08-16
Payer: COMMERCIAL

## 2021-08-16 VITALS
TEMPERATURE: 99 F | SYSTOLIC BLOOD PRESSURE: 126 MMHG | HEART RATE: 80 BPM | BODY MASS INDEX: 23.15 KG/M2 | RESPIRATION RATE: 14 BRPM | WEIGHT: 135.63 LBS | OXYGEN SATURATION: 99 % | DIASTOLIC BLOOD PRESSURE: 82 MMHG | HEIGHT: 64 IN

## 2021-08-16 DIAGNOSIS — S16.1XXA NECK STRAIN, INITIAL ENCOUNTER: Primary | ICD-10-CM

## 2021-08-16 PROCEDURE — 3074F SYST BP LT 130 MM HG: CPT | Performed by: INTERNAL MEDICINE

## 2021-08-16 PROCEDURE — 3079F DIAST BP 80-89 MM HG: CPT | Performed by: INTERNAL MEDICINE

## 2021-08-16 PROCEDURE — 3008F BODY MASS INDEX DOCD: CPT | Performed by: INTERNAL MEDICINE

## 2021-08-16 PROCEDURE — 99214 OFFICE O/P EST MOD 30 MIN: CPT | Performed by: INTERNAL MEDICINE

## 2021-08-16 RX ORDER — CLINDAMYCIN AND BENZOYL PEROXIDE 10; 50 MG/G; MG/G
GEL TOPICAL
COMMUNITY
End: 2021-11-24

## 2021-08-16 RX ORDER — CETIRIZINE HYDROCHLORIDE 10 MG/1
10 TABLET ORAL DAILY
COMMUNITY

## 2021-08-16 NOTE — PROGRESS NOTES
Reid Rock is a 30year old female. HPI:   Patient presents with:  Checkup: Stiff Neck X 1 day , no trauma     Alcaraz Runner presents after having woken up at about 1:00 this morning with a stiff neck.   This is located on the right side of her neck and taking: Reported on 8/16/2021 ) 20 tablet 1   • clotrimazole-betamethasone 1-0.05 % External Cream Use bid to affected areas of skin (Patient not taking: Reported on 1/25/2021 ) 45 g 2   • ondansetron 4 MG Oral Tablet Dispersible 1-2 tabs as needed for kamila upper motor power bilaterally. 1+ biceps reflexes. ASSESSMENT AND PLAN:     1. Neck strain, initial encounter  I feel Austin Clemente has a neck strain of some sort. She is neurologically intact. We discussed options.   I asked her to take Advil 2 tablets 3 t

## 2021-11-16 ENCOUNTER — IMMUNIZATION (OUTPATIENT)
Dept: LAB | Facility: HOSPITAL | Age: 36
End: 2021-11-16
Attending: EMERGENCY MEDICINE
Payer: COMMERCIAL

## 2021-11-16 DIAGNOSIS — Z23 NEED FOR VACCINATION: Primary | ICD-10-CM

## 2021-11-16 PROCEDURE — 0064A SARSCOV2 VAC 50MCG/0.25ML IM: CPT

## 2021-11-23 ENCOUNTER — TELEPHONE (OUTPATIENT)
Dept: DERMATOLOGY CLINIC | Facility: CLINIC | Age: 36
End: 2021-11-23

## 2021-11-23 RX ORDER — CLINDAMYCIN AND BENZOYL PEROXIDE 10; 50 MG/G; MG/G
GEL TOPICAL
Qty: 50 G | Refills: 0 | OUTPATIENT
Start: 2021-11-23

## 2021-11-23 NOTE — TELEPHONE ENCOUNTER
Patient called    Asking for refill of Clindamycin Phos-Benzoyl Perox 1-5 % External Gel    LOV - video visit 1/25/21  Scheduled- 1/6/22

## 2021-11-24 RX ORDER — CLINDAMYCIN AND BENZOYL PEROXIDE 10; 50 MG/G; MG/G
GEL TOPICAL
Qty: 50 G | Refills: 0 | Status: SHIPPED | OUTPATIENT
Start: 2021-11-24

## 2021-12-01 NOTE — TELEPHONE ENCOUNTER
Fax from [address] [place] [address] fax in pa inbox regarding Retin-A Micro Pump 0.06% gel

## 2021-12-03 NOTE — TELEPHONE ENCOUNTER
Fax from [facility]    Approved for Retin-A micro pump 0.06% gel w/pump    11/2/21-12/1/24    Placed fax in pa inbox

## 2021-12-03 NOTE — TELEPHONE ENCOUNTER
Contacted [facility], verified information and updated on approval.  Await confirmation fax from [place].

## 2021-12-13 NOTE — TELEPHONE ENCOUNTER
Fax from [address] form requested for Retin-A Micro Pump Gel W/pump    Placed fax in pa inbox

## 2021-12-14 ENCOUNTER — HOSPITAL ENCOUNTER (EMERGENCY)
Age: 36
Discharge: HOME | End: 2021-12-14
Attending: EMERGENCY MEDICINE

## 2021-12-14 ENCOUNTER — APPOINTMENT (OUTPATIENT)
Dept: GENERAL RADIOLOGY | Age: 36
End: 2021-12-14
Attending: EMERGENCY MEDICINE

## 2021-12-14 VITALS
HEART RATE: 66 BPM | DIASTOLIC BLOOD PRESSURE: 66 MMHG | SYSTOLIC BLOOD PRESSURE: 110 MMHG | RESPIRATION RATE: 14 BRPM | OXYGEN SATURATION: 98 % | WEIGHT: 138.23 LBS | TEMPERATURE: 98 F

## 2021-12-14 DIAGNOSIS — M25.571 ACUTE RIGHT ANKLE PAIN: Primary | ICD-10-CM

## 2021-12-14 PROCEDURE — 29515 APPLICATION SHORT LEG SPLINT: CPT

## 2021-12-14 PROCEDURE — 10002803 HB RX 637: Performed by: EMERGENCY MEDICINE

## 2021-12-14 PROCEDURE — 10004651 HB RX, NO CHARGE ITEM: Performed by: EMERGENCY MEDICINE

## 2021-12-14 PROCEDURE — 99283 EMERGENCY DEPT VISIT LOW MDM: CPT

## 2021-12-14 PROCEDURE — 73610 X-RAY EXAM OF ANKLE: CPT

## 2021-12-14 RX ORDER — ACETAMINOPHEN 500 MG
1000 TABLET ORAL ONCE
Status: COMPLETED | OUTPATIENT
Start: 2021-12-14 | End: 2021-12-14

## 2021-12-14 RX ORDER — HYDROCODONE BITARTRATE AND ACETAMINOPHEN 5; 325 MG/1; MG/1
1 TABLET ORAL EVERY 6 HOURS PRN
Qty: 10 TABLET | Refills: 0 | Status: SHIPPED | OUTPATIENT
Start: 2021-12-14 | End: 2023-06-28 | Stop reason: ALTCHOICE

## 2021-12-14 RX ORDER — IBUPROFEN 600 MG/1
TABLET ORAL
Status: DISCONTINUED
Start: 2021-12-14 | End: 2021-12-15 | Stop reason: HOSPADM

## 2021-12-14 RX ORDER — DOCUSATE SODIUM 100 MG/1
100 CAPSULE, LIQUID FILLED ORAL 2 TIMES DAILY PRN
Qty: 20 CAPSULE | Refills: 0 | Status: SHIPPED | OUTPATIENT
Start: 2021-12-14 | End: 2021-12-14 | Stop reason: SDUPTHER

## 2021-12-14 RX ORDER — ACETAMINOPHEN 325 MG/1
650 TABLET ORAL EVERY 6 HOURS PRN
Qty: 30 TABLET | Refills: 0 | Status: SHIPPED | OUTPATIENT
Start: 2021-12-14 | End: 2021-12-14 | Stop reason: SDUPTHER

## 2021-12-14 RX ORDER — ACETAMINOPHEN 325 MG/1
650 TABLET ORAL EVERY 6 HOURS PRN
Qty: 30 TABLET | Refills: 0 | Status: SHIPPED | OUTPATIENT
Start: 2021-12-14

## 2021-12-14 RX ORDER — IBUPROFEN 600 MG/1
600 TABLET ORAL ONCE
Status: COMPLETED | OUTPATIENT
Start: 2021-12-14 | End: 2021-12-14

## 2021-12-14 RX ORDER — HYDROCODONE BITARTRATE AND ACETAMINOPHEN 5; 325 MG/1; MG/1
1 TABLET ORAL EVERY 6 HOURS PRN
Qty: 10 TABLET | Refills: 0 | Status: SHIPPED | OUTPATIENT
Start: 2021-12-14 | End: 2021-12-14 | Stop reason: SDUPTHER

## 2021-12-14 RX ORDER — DOCUSATE SODIUM 100 MG/1
100 CAPSULE, LIQUID FILLED ORAL 2 TIMES DAILY PRN
Qty: 20 CAPSULE | Refills: 0 | Status: SHIPPED | OUTPATIENT
Start: 2021-12-14 | End: 2023-06-28 | Stop reason: ALTCHOICE

## 2021-12-14 RX ORDER — IBUPROFEN 600 MG/1
600 TABLET ORAL EVERY 6 HOURS PRN
Qty: 20 TABLET | Refills: 0 | Status: SHIPPED | OUTPATIENT
Start: 2021-12-14 | End: 2021-12-14 | Stop reason: SDUPTHER

## 2021-12-14 RX ORDER — ACETAMINOPHEN 500 MG
TABLET ORAL
Status: DISCONTINUED
Start: 2021-12-14 | End: 2021-12-15 | Stop reason: HOSPADM

## 2021-12-14 RX ORDER — IBUPROFEN 600 MG/1
600 TABLET ORAL EVERY 6 HOURS PRN
Qty: 20 TABLET | Refills: 0 | Status: SHIPPED | OUTPATIENT
Start: 2021-12-14

## 2021-12-14 RX ADMIN — IBUPROFEN 600 MG: 600 TABLET ORAL at 20:55

## 2021-12-14 RX ADMIN — ACETAMINOPHEN 1000 MG: 500 TABLET ORAL at 20:54

## 2021-12-14 ASSESSMENT — ENCOUNTER SYMPTOMS
CHILLS: 0
NUMBNESS: 0
WEAKNESS: 0
FEVER: 0
FATIGUE: 0

## 2021-12-15 ENCOUNTER — TELEPHONE (OUTPATIENT)
Dept: SPORTS MEDICINE | Age: 36
End: 2021-12-15

## 2021-12-15 ENCOUNTER — OFFICE VISIT (OUTPATIENT)
Dept: SPORTS MEDICINE | Age: 36
End: 2021-12-15

## 2021-12-15 VITALS
HEIGHT: 64 IN | BODY MASS INDEX: 23.56 KG/M2 | DIASTOLIC BLOOD PRESSURE: 97 MMHG | WEIGHT: 138 LBS | SYSTOLIC BLOOD PRESSURE: 144 MMHG

## 2021-12-15 DIAGNOSIS — S99.911A INJURY OF RIGHT ANKLE, INITIAL ENCOUNTER: ICD-10-CM

## 2021-12-15 DIAGNOSIS — M25.871 IMPINGEMENT SYNDROME OF RIGHT ANKLE: Primary | ICD-10-CM

## 2021-12-15 DIAGNOSIS — Q68.8 OS TRIGONUM SYNDROME: ICD-10-CM

## 2021-12-15 DIAGNOSIS — M25.671 DECREASED RANGE OF MOTION OF RIGHT ANKLE: ICD-10-CM

## 2021-12-15 PROCEDURE — 99204 OFFICE O/P NEW MOD 45 MIN: CPT | Performed by: FAMILY MEDICINE

## 2021-12-15 PROCEDURE — L4387 NON-PNEUM WALK BOOT PRE OTS: HCPCS

## 2021-12-15 RX ORDER — SEGESTERONE ACETATE AND ETHINYL ESTRADIOL 103; 17.4 MG/1; MG/1
RING VAGINAL
COMMUNITY

## 2021-12-15 RX ORDER — CLINDAMYCIN AND BENZOYL PEROXIDE 10; 50 MG/G; MG/G
GEL TOPICAL
COMMUNITY
Start: 2021-11-24

## 2021-12-15 RX ORDER — CETIRIZINE HYDROCHLORIDE 10 MG/1
10 TABLET ORAL DAILY
COMMUNITY

## 2021-12-15 RX ORDER — TRETINOIN 0.6 MG/G
GEL TOPICAL
COMMUNITY

## 2021-12-15 RX ORDER — ALBUTEROL SULFATE 90 UG/1
AEROSOL, METERED RESPIRATORY (INHALATION)
COMMUNITY

## 2021-12-15 ASSESSMENT — ENCOUNTER SYMPTOMS
FATIGUE: 0
CHEST TIGHTNESS: 0
TROUBLE SWALLOWING: 0
SHORTNESS OF BREATH: 0
SLEEP DISTURBANCE: 0
DIARRHEA: 0
DIZZINESS: 0
EYE REDNESS: 0
NAUSEA: 0
NERVOUS/ANXIOUS: 0
FEVER: 0
ACTIVITY CHANGE: 0
PHOTOPHOBIA: 0
NUMBNESS: 0
BACK PAIN: 0
WHEEZING: 0
SEIZURES: 0
SORE THROAT: 0
ABDOMINAL PAIN: 0
HEADACHES: 0
CONSTIPATION: 0

## 2021-12-16 ENCOUNTER — TELEPHONE (OUTPATIENT)
Dept: INTERNAL MEDICINE CLINIC | Facility: CLINIC | Age: 36
End: 2021-12-16

## 2021-12-16 NOTE — TELEPHONE ENCOUNTER
I spoke to [facility] yesterday. This was before she saw Dr. Rakesh Balderas. Briefly she indicated that she slipped and after the slip there was no pain. However all of a sudden her foot and ankle hurt. The pain was quite severe even without putting weight on it.

## 2021-12-22 ENCOUNTER — EXTERNAL RECORD (OUTPATIENT)
Dept: HEALTH INFORMATION MANAGEMENT | Facility: OTHER | Age: 36
End: 2021-12-22

## 2021-12-23 ENCOUNTER — TELEPHONE (OUTPATIENT)
Dept: SPORTS MEDICINE | Age: 36
End: 2021-12-23

## 2021-12-23 ENCOUNTER — TELEPHONE (OUTPATIENT)
Dept: INTERNAL MEDICINE CLINIC | Facility: CLINIC | Age: 36
End: 2021-12-23

## 2021-12-23 NOTE — TELEPHONE ENCOUNTER
RN triaged pt 1230 who reported when she removed her R leg boot last night her calf and foot felt cold. Reports she has been icing foot every night except last night. Does admit to dx of Raynauds.  Pt also c/o R foot tingling and today only her heel is cold

## 2021-12-23 NOTE — TELEPHONE ENCOUNTER
Discussed with Cornelio Siemens. She did hear back from her orthopedics office. She was told that since there were no color changes and the surface of the skin looked okay that there was no urgency.   She did describe that her foot and calf felt colder than the

## 2021-12-24 ENCOUNTER — APPOINTMENT (OUTPATIENT)
Dept: MRI IMAGING | Age: 36
End: 2021-12-24
Attending: FAMILY MEDICINE

## 2021-12-29 ENCOUNTER — HOSPITAL ENCOUNTER (OUTPATIENT)
Dept: MRI IMAGING | Age: 36
Discharge: HOME | End: 2021-12-29
Attending: FAMILY MEDICINE

## 2021-12-29 DIAGNOSIS — M25.871 IMPINGEMENT SYNDROME OF RIGHT ANKLE: ICD-10-CM

## 2021-12-29 PROCEDURE — G1004 CDSM NDSC: HCPCS

## 2021-12-29 PROCEDURE — 73721 MRI JNT OF LWR EXTRE W/O DYE: CPT

## 2021-12-29 ASSESSMENT — ENCOUNTER SYMPTOMS
NERVOUS/ANXIOUS: 0
SLEEP DISTURBANCE: 0
CHEST TIGHTNESS: 0
PHOTOPHOBIA: 0
HEADACHES: 0
SHORTNESS OF BREATH: 0
NAUSEA: 0
BACK PAIN: 0
EYE REDNESS: 0
FATIGUE: 0
DIZZINESS: 0
NUMBNESS: 0
TROUBLE SWALLOWING: 0
SEIZURES: 0
FEVER: 0
ABDOMINAL PAIN: 0
CONSTIPATION: 0
WHEEZING: 0
DIARRHEA: 0
SORE THROAT: 0
ACTIVITY CHANGE: 0

## 2021-12-30 ENCOUNTER — OFFICE VISIT (OUTPATIENT)
Dept: SPORTS MEDICINE | Age: 36
End: 2021-12-30

## 2021-12-30 DIAGNOSIS — M25.871 IMPINGEMENT SYNDROME OF RIGHT ANKLE: Primary | ICD-10-CM

## 2021-12-30 DIAGNOSIS — M25.571 ACUTE RIGHT ANKLE PAIN: ICD-10-CM

## 2021-12-30 DIAGNOSIS — M25.671 DECREASED RANGE OF MOTION OF RIGHT ANKLE: ICD-10-CM

## 2021-12-30 PROCEDURE — 99213 OFFICE O/P EST LOW 20 MIN: CPT | Performed by: FAMILY MEDICINE

## 2022-01-07 ENCOUNTER — HOSPITAL ENCOUNTER (OUTPATIENT)
Dept: PHYSICAL MEDICINE AND REHAB | Age: 37
Discharge: STILL ADMITTED OR ADMINISTRATIVE | End: 2022-01-07
Attending: FAMILY MEDICINE

## 2022-01-07 PROCEDURE — 97161 PT EVAL LOW COMPLEX 20 MIN: CPT | Performed by: PHYSICAL THERAPIST

## 2022-01-07 ASSESSMENT — MOVEMENT AND STRENGTH ASSESSMENTS
SQUATTING: NO DIFFICULTY
YOUR USUAL HOBBIES, RECREATIONAL OR SPORTING ACTIVIITIES: EXTREME DIFFICULTY OR UNABLE TO PERFORM ACTIVITY
TOTAL SCORE: 61.25
ANY OF YOUR USUAL WORK, HOUSEWORK OR SCHOOL ACTIVITIES: A LITTLE BIT OF DIFFICULTY
GOING UP OR DOWN 10 STAIRS (ABOUT 1 FLIGHT OF STAIRS): A LITTLE BIT OF DIFFICULTY
WALKING 2 BLOCKS: MODERATE DIFFICULTY
SITTING FOR 1 HOUR: NO DIFFICULTY
GETTING INTO OR OUT OF THE BATH: A LITTLE BIT OF DIFFICULTY
HOPPING: EXTREME DIFFICULTY OR UNABLE TO PERFORM ACTIVITY
PERFORMING HEAVY ACTIVITIES AROUND YOUR HOME: A LITTLE BIT OF DIFFICULTY
STANDING FOR 1 HOUR: MODERATE DIFFICULTY
WALKING BETWEEN ROOMS: NO DIFFICULTY
MAKING SHARP TURNS WHILE RUNNING FAST: EXTREME DIFFICULTY OR UNABLE TO PERFORM ACTIVITY
PUTTING ON YOUR SHOES OR SOCKS: NO DIFFICULTY
GETTING INTO OR OUT OF A CAR: NO DIFFICULTY
ROLLING OVER IN BED: NO DIFFICULTY
LIFTING AN OBJECT, LIKE A BAG OF GROCERIES, FROM THE FLOOR: NO DIFFICULTY
RUNNING ON EVEN GROUND: EXTREME DIFFICULTY OR UNABLE TO PERFORM ACTIVITY
RUNNING ON UNEVEN GROUND: EXTREME DIFFICULTY OR UNABLE TO PERFORM ACTIVITY
WALKING A MILE: QUITE A BIT OF DIFFICULTY
PERFORMING LIGHT ACTIVITES AROUND YOUR HOME: NO DIFFICULTY

## 2022-01-07 ASSESSMENT — ENCOUNTER SYMPTOMS
ALLEVIATING FACTORS: ICE
PAIN SEVERITY NOW: 1
ALLEVIATING FACTORS: REST
ALLEVIATING FACTORS: AVOIDING MOVEMENT IN INVOLVED AREA
QUALITY: ACHE
QUALITY: SORE
ALLEVIATING FACTORS: OVER-THE-COUNTER MEDICATION
SUBJECTIVE PAIN PROGRESSION: IMPROVED
QUALITY: TIGHT
ALLEVIATING FACTOR: ADVIL
ALLEVIATING FACTORS: HEAT
PAIN LOCATION: RIGHT HEEL
PAIN SCALE AT LOWEST: 1

## 2022-01-12 ENCOUNTER — HOSPITAL ENCOUNTER (OUTPATIENT)
Dept: PHYSICAL MEDICINE AND REHAB | Age: 37
Discharge: STILL ADMITTED OR ADMINISTRATIVE | End: 2022-01-12
Attending: INTERNAL MEDICINE

## 2022-01-12 PROCEDURE — 97110 THERAPEUTIC EXERCISES: CPT | Performed by: PHYSICAL THERAPIST

## 2022-01-19 ENCOUNTER — HOSPITAL ENCOUNTER (OUTPATIENT)
Dept: PHYSICAL MEDICINE AND REHAB | Age: 37
Discharge: STILL ADMITTED OR ADMINISTRATIVE | End: 2022-01-19
Attending: INTERNAL MEDICINE

## 2022-01-19 PROCEDURE — 97110 THERAPEUTIC EXERCISES: CPT | Performed by: PHYSICAL THERAPIST

## 2022-01-19 ASSESSMENT — ENCOUNTER SYMPTOMS
PAIN FREQUENCY: INTERMITTENT
PAIN SEVERITY NOW: 1

## 2022-01-21 ENCOUNTER — HOSPITAL ENCOUNTER (OUTPATIENT)
Dept: PHYSICAL MEDICINE AND REHAB | Age: 37
Discharge: STILL ADMITTED OR ADMINISTRATIVE | End: 2022-01-21
Attending: INTERNAL MEDICINE

## 2022-01-21 PROCEDURE — 97110 THERAPEUTIC EXERCISES: CPT | Performed by: PHYSICAL THERAPIST

## 2022-01-21 ASSESSMENT — ENCOUNTER SYMPTOMS
SINUS PAIN: 0
BACK PAIN: 0
SORE THROAT: 0
NERVOUS/ANXIOUS: 0
SEIZURES: 0
ACTIVITY CHANGE: 0
WHEEZING: 0
AGITATION: 0
CHEST TIGHTNESS: 0
CONSTIPATION: 0
NUMBNESS: 0
SLEEP DISTURBANCE: 0
DIZZINESS: 0
DIARRHEA: 0
FEVER: 0
EYE PAIN: 0
NAUSEA: 0
SHORTNESS OF BREATH: 0
PAIN FREQUENCY: INTERMITTENT
TROUBLE SWALLOWING: 0
SINUS PRESSURE: 0
EYE REDNESS: 0
FATIGUE: 0
PHOTOPHOBIA: 0
ABDOMINAL PAIN: 0
PAIN SEVERITY NOW: 1

## 2022-01-24 ENCOUNTER — HOSPITAL ENCOUNTER (OUTPATIENT)
Dept: PHYSICAL MEDICINE AND REHAB | Age: 37
Discharge: STILL ADMITTED OR ADMINISTRATIVE | End: 2022-01-24
Attending: INTERNAL MEDICINE

## 2022-01-24 ENCOUNTER — OFFICE VISIT (OUTPATIENT)
Dept: SPORTS MEDICINE | Age: 37
End: 2022-01-24

## 2022-01-24 VITALS
HEIGHT: 64 IN | DIASTOLIC BLOOD PRESSURE: 84 MMHG | WEIGHT: 137 LBS | HEART RATE: 72 BPM | SYSTOLIC BLOOD PRESSURE: 122 MMHG | BODY MASS INDEX: 23.39 KG/M2

## 2022-01-24 DIAGNOSIS — M25.671 DECREASED RANGE OF MOTION OF RIGHT ANKLE: ICD-10-CM

## 2022-01-24 DIAGNOSIS — M25.871 IMPINGEMENT SYNDROME OF RIGHT ANKLE: ICD-10-CM

## 2022-01-24 DIAGNOSIS — M25.571 ACUTE RIGHT ANKLE PAIN: ICD-10-CM

## 2022-01-24 DIAGNOSIS — Q68.8 OS TRIGONUM SYNDROME: Primary | ICD-10-CM

## 2022-01-24 PROCEDURE — 99213 OFFICE O/P EST LOW 20 MIN: CPT | Performed by: FAMILY MEDICINE

## 2022-01-24 PROCEDURE — 97110 THERAPEUTIC EXERCISES: CPT | Performed by: PHYSICAL THERAPIST

## 2022-01-24 ASSESSMENT — ENCOUNTER SYMPTOMS
WOUND: 0
PAIN SCALE AT LOWEST: 2
HEADACHES: 1
PAIN SCALE AT HIGHEST: 4
APPETITE CHANGE: 0
PAIN SEVERITY NOW: 3
CHILLS: 0
PAIN FREQUENCY: INTERMITTENT
VOMITING: 0

## 2022-01-26 ENCOUNTER — HOSPITAL ENCOUNTER (OUTPATIENT)
Dept: PHYSICAL MEDICINE AND REHAB | Age: 37
Discharge: STILL ADMITTED OR ADMINISTRATIVE | End: 2022-01-26
Attending: INTERNAL MEDICINE

## 2022-01-26 PROCEDURE — 97110 THERAPEUTIC EXERCISES: CPT | Performed by: PHYSICAL THERAPIST

## 2022-01-26 ASSESSMENT — ENCOUNTER SYMPTOMS
PAIN SEVERITY NOW: 3
PAIN FREQUENCY: INTERMITTENT

## 2022-01-27 ENCOUNTER — APPOINTMENT (OUTPATIENT)
Dept: SPORTS MEDICINE | Age: 37
End: 2022-01-27

## 2022-02-01 ENCOUNTER — HOSPITAL ENCOUNTER (OUTPATIENT)
Dept: PHYSICAL MEDICINE AND REHAB | Age: 37
Discharge: STILL ADMITTED OR ADMINISTRATIVE | End: 2022-02-01
Attending: INTERNAL MEDICINE

## 2022-02-01 PROCEDURE — 97110 THERAPEUTIC EXERCISES: CPT | Performed by: PHYSICAL THERAPIST

## 2022-02-01 ASSESSMENT — ENCOUNTER SYMPTOMS
PAIN FREQUENCY: INTERMITTENT
PAIN SEVERITY NOW: 1

## 2022-02-04 ENCOUNTER — HOSPITAL ENCOUNTER (OUTPATIENT)
Dept: PHYSICAL MEDICINE AND REHAB | Age: 37
Discharge: STILL ADMITTED OR ADMINISTRATIVE | End: 2022-02-04
Attending: INTERNAL MEDICINE

## 2022-02-04 PROCEDURE — 97110 THERAPEUTIC EXERCISES: CPT | Performed by: PHYSICAL THERAPIST

## 2022-02-04 ASSESSMENT — ENCOUNTER SYMPTOMS: PAIN FREQUENCY: INTERMITTENT

## 2022-02-07 ENCOUNTER — HOSPITAL ENCOUNTER (OUTPATIENT)
Dept: PHYSICAL MEDICINE AND REHAB | Age: 37
Discharge: STILL ADMITTED OR ADMINISTRATIVE | End: 2022-02-07
Attending: FAMILY MEDICINE

## 2022-02-07 PROCEDURE — 97110 THERAPEUTIC EXERCISES: CPT | Performed by: PHYSICAL THERAPIST

## 2022-02-07 ASSESSMENT — ENCOUNTER SYMPTOMS: PAIN FREQUENCY: INTERMITTENT

## 2022-02-09 ENCOUNTER — HOSPITAL ENCOUNTER (OUTPATIENT)
Dept: PHYSICAL MEDICINE AND REHAB | Age: 37
Discharge: STILL ADMITTED OR ADMINISTRATIVE | End: 2022-02-09
Attending: FAMILY MEDICINE

## 2022-02-09 PROCEDURE — 97110 THERAPEUTIC EXERCISES: CPT | Performed by: PHYSICAL THERAPIST

## 2022-02-09 ASSESSMENT — ENCOUNTER SYMPTOMS: PAIN FREQUENCY: INTERMITTENT

## 2022-02-14 ENCOUNTER — APPOINTMENT (OUTPATIENT)
Dept: PHYSICAL MEDICINE AND REHAB | Age: 37
End: 2022-02-14
Attending: FAMILY MEDICINE

## 2022-02-18 ENCOUNTER — APPOINTMENT (OUTPATIENT)
Dept: PHYSICAL MEDICINE AND REHAB | Age: 37
End: 2022-02-18
Attending: FAMILY MEDICINE

## 2022-02-21 ENCOUNTER — HOSPITAL ENCOUNTER (OUTPATIENT)
Dept: PHYSICAL MEDICINE AND REHAB | Age: 37
Discharge: STILL ADMITTED OR ADMINISTRATIVE | End: 2022-02-21
Attending: FAMILY MEDICINE

## 2022-02-21 PROCEDURE — 97110 THERAPEUTIC EXERCISES: CPT | Performed by: PHYSICAL THERAPIST

## 2022-02-21 ASSESSMENT — MOVEMENT AND STRENGTH ASSESSMENTS
GETTING INTO OR OUT OF A CAR: NO DIFFICULTY
GETTING INTO OR OUT OF THE BATH: NO DIFFICULTY
WALKING 2 BLOCKS: NO DIFFICULTY
SQUATTING: A LITTLE BIT OF DIFFICULTY
YOUR USUAL HOBBIES, RECREATIONAL OR SPORTING ACTIVIITIES: NO DIFFICULTY
WALKING A MILE: NO DIFFICULTY
STANDING FOR 1 HOUR: NO DIFFICULTY
HOPPING: EXTREME DIFFICULTY OR UNABLE TO PERFORM ACTIVITY
MAKING SHARP TURNS WHILE RUNNING FAST: EXTREME DIFFICULTY OR UNABLE TO PERFORM ACTIVITY
RUNNING ON UNEVEN GROUND: EXTREME DIFFICULTY OR UNABLE TO PERFORM ACTIVITY
RUNNING ON EVEN GROUND: EXTREME DIFFICULTY OR UNABLE TO PERFORM ACTIVITY
GOING UP OR DOWN 10 STAIRS (ABOUT 1 FLIGHT OF STAIRS): NO DIFFICULTY
LIFTING AN OBJECT, LIKE A BAG OF GROCERIES, FROM THE FLOOR: NO DIFFICULTY
ROLLING OVER IN BED: NO DIFFICULTY
TOTAL SCORE: 77.5
WALKING BETWEEN ROOMS: NO DIFFICULTY
SITTING FOR 1 HOUR: NO DIFFICULTY
PERFORMING HEAVY ACTIVITIES AROUND YOUR HOME: A LITTLE BIT OF DIFFICULTY
ANY OF YOUR USUAL WORK, HOUSEWORK OR SCHOOL ACTIVITIES: NO DIFFICULTY
PUTTING ON YOUR SHOES OR SOCKS: NO DIFFICULTY
PERFORMING LIGHT ACTIVITES AROUND YOUR HOME: NO DIFFICULTY

## 2022-02-21 ASSESSMENT — ENCOUNTER SYMPTOMS: PAIN: 1

## 2022-02-23 ENCOUNTER — APPOINTMENT (OUTPATIENT)
Dept: PHYSICAL MEDICINE AND REHAB | Age: 37
End: 2022-02-23
Attending: FAMILY MEDICINE

## 2022-02-28 ENCOUNTER — APPOINTMENT (OUTPATIENT)
Dept: PHYSICAL MEDICINE AND REHAB | Age: 37
End: 2022-02-28
Attending: FAMILY MEDICINE

## 2022-03-02 ENCOUNTER — APPOINTMENT (OUTPATIENT)
Dept: PHYSICAL MEDICINE AND REHAB | Age: 37
End: 2022-03-02
Attending: FAMILY MEDICINE

## 2022-04-23 ENCOUNTER — TELEPHONE (OUTPATIENT)
Dept: INTERNAL MEDICINE CLINIC | Facility: CLINIC | Age: 37
End: 2022-04-23

## 2022-04-23 RX ORDER — RIZATRIPTAN BENZOATE 10 MG/1
10 TABLET, ORALLY DISINTEGRATING ORAL AS NEEDED
Qty: 10 TABLET | Refills: 1 | Status: SHIPPED | OUTPATIENT
Start: 2022-04-23

## 2022-04-23 NOTE — TELEPHONE ENCOUNTER
On call;      Had migraine 4/21; took Maxalt 10 mg on 4/21; had +frontal headache, with +nausea    Rec- advise add'l Maxalt 10 mg po daily prn headache, refill #9, 3 RF    Pt called

## 2022-10-20 ENCOUNTER — IMMUNIZATION (OUTPATIENT)
Dept: LAB | Age: 37
End: 2022-10-20
Attending: EMERGENCY MEDICINE
Payer: COMMERCIAL

## 2022-10-20 DIAGNOSIS — Z23 NEED FOR VACCINATION: Primary | ICD-10-CM

## 2022-10-20 PROCEDURE — 0134A SARSCOV2 VAC BVL 50MCG/0.5ML: CPT

## 2022-10-27 ENCOUNTER — IMMUNIZATION (OUTPATIENT)
Dept: LAB | Age: 37
End: 2022-10-27
Attending: EMERGENCY MEDICINE
Payer: COMMERCIAL

## 2022-10-27 DIAGNOSIS — Z23 NEED FOR VACCINATION: Primary | ICD-10-CM

## 2022-10-27 PROCEDURE — 90471 IMMUNIZATION ADMIN: CPT

## 2022-10-27 PROCEDURE — 90686 IIV4 VACC NO PRSV 0.5 ML IM: CPT

## 2023-02-17 ENCOUNTER — TELEPHONE (OUTPATIENT)
Dept: INTERNAL MEDICINE CLINIC | Facility: CLINIC | Age: 38
End: 2023-02-17

## 2023-02-17 NOTE — TELEPHONE ENCOUNTER
Spoke to [place]  Symptoms started on Wednesday  Has been feeling worse    This morning temp 100 degrees  Reports her head is congested-taking sudafed  Cough is bringing up phlegm. Has burning in chest when coughing. Has body aches, yesterday felt like she was hit by a truck  Denies changes to her breathing, no shortness of breath, no chest pain or tightness. Does have albuterol inhaler on hand, has not had to use it. Home covid test was negative on Wednesday. Has not done any since then, does not have any more tests currently. Was in [employer] last week. Went to a super bowl party on Sunday. She has heard some people at the party have since tested positive for COVID. Has been taking tylenol prn    Asks if she should do a PCR COVID test, does she need to be seen?  She lives close to the [facility] lab and could go there for testing    To Dr Joe Dawn to please review and advise on best next steps    [facility] [place]

## 2023-02-17 NOTE — TELEPHONE ENCOUNTER
Discussed with Nelly Baker. Reviewed symptoms. She does have nasal congestion. No sore throat. Temperature 100. Body aches were worse yesterday and a little bit better today. No shortness of breath. No GI symptoms. A little bit lost a taste but not complete. Her headache seems to be from head congestion. She recently got the [place] bivalent vaccine in October. She has no high risk features. Therefore Paxlovid should not be prescribed. We talked about isolation for 5 days and then wearing a mask day 6 through 10. Thankfully she can work from home. We talked about symptomatic treatment with Tylenol for body aches or temperature. Sudafed for head congestion. Or products like DayQuil and NyQuil if she wishes for the cough. She knows to call if symptoms should worsen. Particularly if she gets any respiratory symptoms.     She verbalized understanding

## 2023-02-17 NOTE — TELEPHONE ENCOUNTER
Patient calling for appointment today with Dr. Mayelin Babin. Sick since Wednesday  Asking for PCR Covid test.  This morning has 100 fever. Sinus congestion, cough with burring in the chest. Coughing up phlegm. Slight headache, body aches. Took home Covid test taken Wednesday, was negative.     Best call back number [phone]

## 2023-06-28 ENCOUNTER — WALK IN (OUTPATIENT)
Dept: URGENT CARE | Age: 38
End: 2023-06-28

## 2023-06-28 ENCOUNTER — TELEPHONE (OUTPATIENT)
Dept: INTERNAL MEDICINE CLINIC | Facility: CLINIC | Age: 38
End: 2023-06-28

## 2023-06-28 DIAGNOSIS — Z23 NEED FOR PROPHYLACTIC VACCINATION WITH TETANUS-DIPHTHERIA (TD): Primary | ICD-10-CM

## 2023-06-28 PROCEDURE — 90471 IMMUNIZATION ADMIN: CPT | Performed by: NURSE PRACTITIONER

## 2023-06-28 PROCEDURE — 90714 TD VACC NO PRESV 7 YRS+ IM: CPT | Performed by: NURSE PRACTITIONER

## 2023-06-28 NOTE — TELEPHONE ENCOUNTER
Called patient who punctured her finger on a wire. No documentation on Tdap on file. Advised patient to either go to  for evaluation and Tdap.  Or get Tdap at the pharmacy - verbalized understanding   Condition update 6/29

## 2023-06-28 NOTE — TELEPHONE ENCOUNTER
Please call patient  She punctured finger on wire (clean wire), does she need tetanus? Should she come to office? Go to drive through?   Tasked to nursing

## 2023-07-07 NOTE — TELEPHONE ENCOUNTER
Patient was seen at [facility] at [facility] - had Td, the puncture site is painless , no bleeding, Is using Neosporin

## 2023-10-01 NOTE — PATIENT INSTRUCTIONS
You were seen in clinic today for evaluation of gastrointestinal symptoms. Based on examination, there is clinical suspicion for infectious diarrhea given acuity of symptoms and recent travel history. We recommended:  -Try to find any other triggering foods and factors  - You may continue the brat diet, an alternative diet would be the FODMAP diet. Be sure to take in good hydration and water intake as best as you can  -We will check nonfasting blood tests and urine tests to evaluate further  - We have ordered stool test to complete at home and to bring a sample back to the lab.   If there is a definitive infection, we may recommend dedicated antibiotic therapy at that time    We did discuss red flag signs and symptoms that would warrant immediate ER evaluation including fevers, 10 out of 10 abdominal pain, persistent nausea with vomiting/diarrhea in which you are not able to maintain your usual oral intake    We will notify you of the results and go over them in detail regarding next steps    Return to clinic in 2 months for annual physical examination with Dr. Pari Weiss

## 2023-10-02 ENCOUNTER — APPOINTMENT (OUTPATIENT)
Dept: LAB | Facility: HOSPITAL | Age: 38
End: 2023-10-02
Attending: INTERNAL MEDICINE
Payer: COMMERCIAL

## 2023-10-02 ENCOUNTER — OFFICE VISIT (OUTPATIENT)
Dept: INTERNAL MEDICINE CLINIC | Facility: CLINIC | Age: 38
End: 2023-10-02

## 2023-10-02 VITALS
TEMPERATURE: 98 F | DIASTOLIC BLOOD PRESSURE: 100 MMHG | HEIGHT: 64 IN | SYSTOLIC BLOOD PRESSURE: 140 MMHG | OXYGEN SATURATION: 100 % | WEIGHT: 129 LBS | HEART RATE: 70 BPM | BODY MASS INDEX: 22.02 KG/M2

## 2023-10-02 DIAGNOSIS — Z87.442 HISTORY OF KIDNEY STONES: ICD-10-CM

## 2023-10-02 DIAGNOSIS — R10.30 LOWER ABDOMINAL PAIN: ICD-10-CM

## 2023-10-02 DIAGNOSIS — K52.9 CHRONIC DIARRHEA: Primary | ICD-10-CM

## 2023-10-02 PROCEDURE — 87045 FECES CULTURE AEROBIC BACT: CPT

## 2023-10-02 PROCEDURE — 87338 HPYLORI STOOL AG IA: CPT

## 2023-10-02 PROCEDURE — 87272 CRYPTOSPORIDIUM AG IF: CPT

## 2023-10-02 PROCEDURE — 87427 SHIGA-LIKE TOXIN AG IA: CPT

## 2023-10-02 PROCEDURE — 87046 STOOL CULTR AEROBIC BACT EA: CPT

## 2023-10-02 PROCEDURE — 87329 GIARDIA AG IA: CPT

## 2023-10-02 RX ORDER — DICYCLOMINE HYDROCHLORIDE 10 MG/1
10 CAPSULE ORAL 4 TIMES DAILY
Qty: 40 CAPSULE | Refills: 3 | Status: SHIPPED | OUTPATIENT
Start: 2023-10-02 | End: 2023-11-11

## 2023-10-03 ENCOUNTER — LAB ENCOUNTER (OUTPATIENT)
Dept: LAB | Facility: HOSPITAL | Age: 38
End: 2023-10-03
Attending: INTERNAL MEDICINE
Payer: COMMERCIAL

## 2023-10-03 DIAGNOSIS — Z87.442 HISTORY OF KIDNEY STONES: ICD-10-CM

## 2023-10-03 DIAGNOSIS — R10.30 LOWER ABDOMINAL PAIN: ICD-10-CM

## 2023-10-03 DIAGNOSIS — K52.9 CHRONIC DIARRHEA: ICD-10-CM

## 2023-10-03 LAB
ALBUMIN SERPL-MCNC: 3.9 G/DL (ref 3.4–5)
ALBUMIN/GLOB SERPL: 1.1 {RATIO} (ref 1–2)
ALP LIVER SERPL-CCNC: 56 U/L
ALT SERPL-CCNC: 18 U/L
ANION GAP SERPL CALC-SCNC: 9 MMOL/L (ref 0–18)
AST SERPL-CCNC: 13 U/L (ref 15–37)
BASOPHILS # BLD AUTO: 0.05 X10(3) UL (ref 0–0.2)
BASOPHILS NFR BLD AUTO: 0.9 %
BILIRUB SERPL-MCNC: 0.5 MG/DL (ref 0.1–2)
BILIRUB UR QL: NEGATIVE
BUN BLD-MCNC: 9 MG/DL (ref 7–18)
BUN/CREAT SERPL: 9.5 (ref 10–20)
CALCIUM BLD-MCNC: 9.3 MG/DL (ref 8.5–10.1)
CHLORIDE SERPL-SCNC: 109 MMOL/L (ref 98–112)
CLARITY UR: CLEAR
CO2 SERPL-SCNC: 25 MMOL/L (ref 21–32)
COLOR UR: COLORLESS
CREAT BLD-MCNC: 0.95 MG/DL
CRP SERPL-MCNC: <0.29 MG/DL (ref ?–0.3)
CRYPTOSP AG STL QL IA: NEGATIVE
DEPRECATED RDW RBC AUTO: 41.7 FL (ref 35.1–46.3)
EGFRCR SERPLBLD CKD-EPI 2021: 80 ML/MIN/1.73M2 (ref 60–?)
EOSINOPHIL # BLD AUTO: 0.17 X10(3) UL (ref 0–0.7)
EOSINOPHIL NFR BLD AUTO: 3.1 %
ERYTHROCYTE [DISTWIDTH] IN BLOOD BY AUTOMATED COUNT: 12.7 % (ref 11–15)
FASTING STATUS PATIENT QL REPORTED: NO
G LAMBLIA AG STL QL IA: NEGATIVE
GLOBULIN PLAS-MCNC: 3.7 G/DL (ref 2.8–4.4)
GLUCOSE BLD-MCNC: 66 MG/DL (ref 70–99)
GLUCOSE UR-MCNC: NORMAL MG/DL
HCT VFR BLD AUTO: 43.2 %
HGB BLD-MCNC: 14.4 G/DL
HGB UR QL STRIP.AUTO: NEGATIVE
IGA SERPL-MCNC: 145 MG/DL (ref 70–312)
IMM GRANULOCYTES # BLD AUTO: 0.01 X10(3) UL (ref 0–1)
IMM GRANULOCYTES NFR BLD: 0.2 %
KETONES UR-MCNC: NEGATIVE MG/DL
LEUKOCYTE ESTERASE UR QL STRIP.AUTO: NEGATIVE
LIPASE SERPL-CCNC: 54 U/L (ref 13–75)
LYMPHOCYTES # BLD AUTO: 1.38 X10(3) UL (ref 1–4)
LYMPHOCYTES NFR BLD AUTO: 25.3 %
MCH RBC QN AUTO: 29.9 PG (ref 26–34)
MCHC RBC AUTO-ENTMCNC: 33.3 G/DL (ref 31–37)
MCV RBC AUTO: 89.8 FL
MONOCYTES # BLD AUTO: 0.33 X10(3) UL (ref 0.1–1)
MONOCYTES NFR BLD AUTO: 6 %
NEUTROPHILS # BLD AUTO: 3.52 X10 (3) UL (ref 1.5–7.7)
NEUTROPHILS # BLD AUTO: 3.52 X10(3) UL (ref 1.5–7.7)
NEUTROPHILS NFR BLD AUTO: 64.5 %
NITRITE UR QL STRIP.AUTO: NEGATIVE
OSMOLALITY SERPL CALC.SUM OF ELEC: 293 MOSM/KG (ref 275–295)
PH UR: 6.5 [PH] (ref 5–8)
PLATELET # BLD AUTO: 232 10(3)UL (ref 150–450)
POTASSIUM SERPL-SCNC: 3.7 MMOL/L (ref 3.5–5.1)
PROT SERPL-MCNC: 7.6 G/DL (ref 6.4–8.2)
PROT UR-MCNC: NEGATIVE MG/DL
RBC # BLD AUTO: 4.81 X10(6)UL
SODIUM SERPL-SCNC: 143 MMOL/L (ref 136–145)
SP GR UR STRIP: <1.005 (ref 1–1.03)
UROBILINOGEN UR STRIP-ACNC: NORMAL
WBC # BLD AUTO: 5.5 X10(3) UL (ref 4–11)

## 2023-10-03 PROCEDURE — 36415 COLL VENOUS BLD VENIPUNCTURE: CPT

## 2023-10-03 PROCEDURE — 82784 ASSAY IGA/IGD/IGG/IGM EACH: CPT

## 2023-10-03 PROCEDURE — 80053 COMPREHEN METABOLIC PANEL: CPT

## 2023-10-03 PROCEDURE — 86364 TISS TRNSGLTMNASE EA IG CLAS: CPT

## 2023-10-03 PROCEDURE — 82785 ASSAY OF IGE: CPT

## 2023-10-03 PROCEDURE — 85025 COMPLETE CBC W/AUTO DIFF WBC: CPT

## 2023-10-03 PROCEDURE — 86140 C-REACTIVE PROTEIN: CPT

## 2023-10-03 PROCEDURE — 81003 URINALYSIS AUTO W/O SCOPE: CPT

## 2023-10-03 PROCEDURE — 86003 ALLG SPEC IGE CRUDE XTRC EA: CPT

## 2023-10-03 PROCEDURE — 83690 ASSAY OF LIPASE: CPT

## 2023-10-04 LAB
CLAM IGE QN: <0.1 KUA/L (ref ?–0.1)
CODFISH IGE QN: <0.1 KUA/L (ref ?–0.1)
CORN IGE QN: <0.1 KUA/L (ref ?–0.1)
COW MILK IGE QN: <0.1 KUA/L (ref ?–0.1)
EGG WHITE IGE QN: <0.1 KUA/L (ref ?–0.1)
IGE SERPL-ACNC: 8.87 KU/L (ref 2–214)
PEANUT IGE QN: <0.1 KUA/L (ref ?–0.1)
SCALLOP IGE QN: <0.1 KUA/L (ref ?–0.1)
SESAME SEED IGE QN: <0.1 KUA/L (ref ?–0.1)
SHRIMP IGE QN: <0.1 KUA/L (ref ?–0.1)
SOYBEAN IGE QN: <0.1 KUA/L (ref ?–0.1)
WALNUT IGE QN: <0.1 KUA/L (ref ?–0.1)
WHEAT IGE QN: <0.1 KUA/L (ref ?–0.1)

## 2023-10-05 LAB
H PYLORI AG STL QL IA: NEGATIVE
TTG IGA SER-ACNC: 0.3 U/ML (ref ?–7)

## 2023-10-21 ENCOUNTER — TELEPHONE (OUTPATIENT)
Dept: INTERNAL MEDICINE CLINIC | Facility: CLINIC | Age: 38
End: 2023-10-21

## 2023-10-21 RX ORDER — AZITHROMYCIN 250 MG/1
TABLET, FILM COATED ORAL
Qty: 6 TABLET | Refills: 1 | Status: SHIPPED | OUTPATIENT
Start: 2023-10-21 | End: 2023-10-26

## 2023-10-21 RX ORDER — ALBUTEROL SULFATE 90 UG/1
2 AEROSOL, METERED RESPIRATORY (INHALATION) EVERY 6 HOURS PRN
Qty: 1 EACH | Refills: 11 | Status: SHIPPED | OUTPATIENT
Start: 2023-10-21

## 2024-05-06 ENCOUNTER — PATIENT MESSAGE (OUTPATIENT)
Dept: INTERNAL MEDICINE CLINIC | Facility: CLINIC | Age: 39
End: 2024-05-06

## 2024-05-06 ENCOUNTER — TELEPHONE (OUTPATIENT)
Dept: INTERNAL MEDICINE CLINIC | Facility: CLINIC | Age: 39
End: 2024-05-06

## 2024-05-06 NOTE — TELEPHONE ENCOUNTER
Patient is calling and states she found a tick on her neck this morning 5/6/24.  Patient [place] was [place]     Patient is asking if she should go on an antibiotic or come into the office.    Patient did make an appt for tomorrow with Dr Hsieh @ 4    Please call and advise

## 2024-05-06 NOTE — TELEPHONE ENCOUNTER
From: Pricilla Andersen  To: Kiet Rosenthal  Sent: 5/6/2024 12:24 PM CDT  Subject: tick bite    Hi Dr. Rosenthal,    I have an appointment with Dr. Hsieh tomorrow, but wanted to send through these photos. I found a tick on my neck this morning. I went hiking yesterday, and I'm not sure if its been on me since yesterday or if it was on the flannel shirt I wore hiking yesterday that I put on this morning. Checking to see if I should do anything else besides clean the spot with alcohol today.    thanks!  Pricilla

## 2024-05-07 ENCOUNTER — OFFICE VISIT (OUTPATIENT)
Dept: INTERNAL MEDICINE CLINIC | Facility: CLINIC | Age: 39
End: 2024-05-07

## 2024-05-07 VITALS
WEIGHT: 129.25 LBS | HEART RATE: 63 BPM | DIASTOLIC BLOOD PRESSURE: 70 MMHG | SYSTOLIC BLOOD PRESSURE: 110 MMHG | BODY MASS INDEX: 22.07 KG/M2 | OXYGEN SATURATION: 97 % | HEIGHT: 64 IN
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
## 2024-05-07 PROCEDURE — 99214 OFFICE O/P EST MOD 30 MIN: CPT | Performed by: INTERNAL MEDICINE

## 2024-05-07 NOTE — PROGRESS NOTES
Pricilla Andersen is a 38 year old female.  Chief Complaint   Patient presents with    Checkup     Possible Tick Bite after hiking near [facility]. Took picture of red area on neck when this occurred. Area of concern in behind right ear. No new symptoms since last visit.     HPI:   Pricilla Andersen is a 38 year old female who presents for a tick bite.    Patient was went on a hike in the woods on Sunday. She didn't notice any tick bites on Sunday.     Monday morning she put on the shirt she wore while hiking on Sunday. In a meeting later that morning, she had a discomfort in her R neck and pulled the entire tick out of her skin.     Patient believes the tick was on her shirt that she put on Monday morning so believes the tick was attached for <24 hrs.    Picture of tick in media TE 5/6/24, not engorged.    Denies rash in the area since removal.     Denies hx of previous tick bites.    Wt Readings from Last 6 Encounters:   05/07/24 129 lb 4 oz (58.6 kg)   10/02/23 129 lb (58.5 kg)   08/16/21 135 lb 9.6 oz (61.5 kg)   07/30/20 123 lb (55.8 kg)   10/29/19 123 lb (55.8 kg)   10/04/19 123 lb (55.8 kg)     Body mass index is 22.19 kg/m².     Current Outpatient Medications   Medication Sig Dispense Refill    albuterol (PROAIR HFA) 108 (90 Base) MCG/ACT Inhalation Aero Soln Inhale 2 puffs into the lungs every 6 (six) hours as needed for Wheezing. 1 each 11    Clindamycin Phos-Benzoyl Perox 1-5 % External Gel clindamycin 1 %-benzoyl peroxide 5 % topical gel with pump  BRIANA TOPICALLY AA BID 50 g 0    cetirizine 10 MG Oral Tab Take 1 tablet (10 mg total) by mouth daily.      Segesterone-Ethinyl Estradiol 0.15-0.013 MG/24HR Vaginal Ring Annovera 0.15 mg-0.013 mg/24 hr vaginal ring      Azelaic Acid (FINACEA) 15 % External Gel Use bid as directed to face 50 g 6    Tretinoin Microsphere (RETIN-A MICRO PUMP) 0.06 % External Gel Apply 1 Application topically daily. 50 g 0    zolpidem (AMBIEN) 5 MG Oral Tab Take 1 tablet (5  mg total) by mouth nightly as needed for Sleep. 20 tablet 1    clotrimazole-betamethasone 1-0.05 % External Cream Use bid to affected areas of skin 45 g 2    ondansetron 4 MG Oral Tablet Dispersible 1-2 tabs as needed for nausea associated with migraine; no more than 6 tabs in 24 hours 30 tablet 1      Past Medical History:    Anxiety    Depression    H/O human papillomavirus infection    H/O spinal fusion    Hx of migraines    Scoliosis      Past Surgical History:   Procedure Laterality Date    Spinal fusion      thoracic lumbar with rods      Family History   Problem Relation Age of Onset    Other (Cancer glioblastoma) Father         CLL      Social History:   Social History     Socioeconomic History    Marital status: Single    Number of children: 0   Occupational History    Occupation:    Tobacco Use    Smoking status: Never    Smokeless tobacco: Never   Vaping Use    Vaping status: Never Used   Substance and Sexual Activity    Alcohol use: Yes     Alcohol/week: 2.0 - 3.0 standard drinks of alcohol     Types: 2 - 3 Glasses of wine per week     Comment: 3x/week    Drug use: No   Other Topics Concern    Caffeine Concern Yes     Comment: 1-2 cups pf coffee daily    Exercise Yes     Comment: walking twice a day    Right Handed Yes    Reaction to local anesthetic No          REVIEW OF SYSTEMS:   GENERAL: feels well otherwise  SKIN: + tick bite R neck    EXAM:   /70   Pulse 63   Ht 5' 4\" (1.626 m)   Wt 129 lb 4 oz (58.6 kg)   SpO2 97%   BMI 22.19 kg/m²     GENERAL: well developed, well nourished, in no apparent distress  SKIN: R neck with no erythema or edema in area of bite  NEURO: A&O x 3, moves all 4 extremities spontaneously      ASSESSMENT AND PLAN:   Pricilla Andersen is a 38 year old female who presents for a tick bite.    Tick bite, unspecified site, initial encounter  - attachment of tick likely <24 hrs per hx, not engorged per pic in TE 5/6/24  - offered course of ppx doxycyline,  patient declines would prefer to monitor for rash. patient was advised to call if rash develops    RTC as previously scheduled or sooner PRN.    For E/M code - 30 minutes spent reviewing performing chart review, obtaining a history, performing a physical exam, reviewing the assessment/plan, placing orders, and completing documentation.     Jacquelyn Hsieh DO  5/7/2024  4:14 PM

## 2024-06-02 ENCOUNTER — MOBILE ENCOUNTER (OUTPATIENT)
Dept: INTERNAL MEDICINE CLINIC | Facility: CLINIC | Age: 39
End: 2024-06-02

## 2024-06-02 RX ORDER — AZITHROMYCIN 250 MG/1
TABLET, FILM COATED ORAL
Qty: 6 TABLET | Refills: 0 | Status: SHIPPED | OUTPATIENT
Start: 2024-06-02

## 2024-06-04 NOTE — PROGRESS NOTES
Patient called on-call Sunday with sinus infection symptoms for approximately 3 to 5 days, claims she does get these 1-2 times per year, the Z-Uriel is curative, she started over-the-counter regimen, but it seems to be progressing on over the weekend, prompting her call.  She is not toxic, does not have fever or chills, but significant discharge.  Z-Uriel was sent to her pharmacy with instructions to follow-up in the office she verbalized understanding.

## 2024-06-05 ENCOUNTER — OFFICE VISIT (OUTPATIENT)
Dept: INTERNAL MEDICINE CLINIC | Facility: CLINIC | Age: 39
End: 2024-06-05
Payer: COMMERCIAL

## 2024-06-05 ENCOUNTER — TELEPHONE (OUTPATIENT)
Dept: INTERNAL MEDICINE CLINIC | Facility: CLINIC | Age: 39
End: 2024-06-05

## 2024-06-05 VITALS
HEART RATE: 76 BPM | OXYGEN SATURATION: 99 % | HEIGHT: 64 IN | WEIGHT: 125.38 LBS | DIASTOLIC BLOOD PRESSURE: 70 MMHG | SYSTOLIC BLOOD PRESSURE: 100 MMHG | TEMPERATURE: 98 F | BODY MASS INDEX: 21.4 KG/M2

## 2024-06-05 DIAGNOSIS — J02.9 SORE THROAT: Primary | ICD-10-CM

## 2024-06-05 LAB
CONTROL LINE PRESENT WITH A CLEAR BACKGROUND (YES/NO): YES YES/NO
KIT LOT #: NORMAL NUMERIC
STREP GRP A CUL-SCR: NEGATIVE

## 2024-06-05 PROCEDURE — 87880 STREP A ASSAY W/OPTIC: CPT | Performed by: INTERNAL MEDICINE

## 2024-06-05 PROCEDURE — 99214 OFFICE O/P EST MOD 30 MIN: CPT | Performed by: INTERNAL MEDICINE

## 2024-06-05 RX ORDER — SPIRONOLACTONE 25 MG/1
TABLET ORAL
COMMUNITY
Start: 2024-06-04

## 2024-06-05 RX ORDER — METHYLPREDNISOLONE 4 MG/1
TABLET ORAL
Qty: 21 EACH | Refills: 0 | Status: SHIPPED | OUTPATIENT
Start: 2024-06-05

## 2024-06-05 NOTE — PROGRESS NOTES
Pricilla Andersen is a 38 year old female.  Chief Complaint   Patient presents with    Nasal Congestion     Feeling Congested. Currently on last day of abx (Zpak) and also taking over-the-counter Advil and Sudafed during the day. Using Mucinex at night. Also using her Inhaler; Feeling like throat is closing.     HPI:   Pricilla Andersen is a 38 year old female who presents for URI sx.    Sx started 1 week ago.     Given rx for azithromycin for empiric tx of sinusitis, has 1 day left, doesn't feel it has helped that much.    Sx include congestion, sore throat, clogged ears, cough with mucous. Using albuterol inhaler without much relief.     Tested for COVID negative at home last week.    Wt Readings from Last 6 Encounters:   06/05/24 125 lb 6 oz (56.9 kg)   05/07/24 129 lb 4 oz (58.6 kg)   10/02/23 129 lb (58.5 kg)   08/16/21 135 lb 9.6 oz (61.5 kg)   07/30/20 123 lb (55.8 kg)   10/29/19 123 lb (55.8 kg)     Body mass index is 21.52 kg/m².       Current Outpatient Medications   Medication Sig Dispense Refill    spironolactone 25 MG Oral Tab       methylPREDNISolone (MEDROL) 4 MG Oral Tablet Therapy Pack As directed. 21 each 0    azithromycin 250 MG Oral Tab 2 PO day one, one PO days two through five 6 tablet 0    albuterol (PROAIR HFA) 108 (90 Base) MCG/ACT Inhalation Aero Soln Inhale 2 puffs into the lungs every 6 (six) hours as needed for Wheezing. 1 each 11    Clindamycin Phos-Benzoyl Perox 1-5 % External Gel clindamycin 1 %-benzoyl peroxide 5 % topical gel with pump  BRIANA TOPICALLY AA BID 50 g 0    cetirizine 10 MG Oral Tab Take 1 tablet (10 mg total) by mouth daily.      Segesterone-Ethinyl Estradiol 0.15-0.013 MG/24HR Vaginal Ring Annovera 0.15 mg-0.013 mg/24 hr vaginal ring      Azelaic Acid (FINACEA) 15 % External Gel Use bid as directed to face 50 g 6    Tretinoin Microsphere (RETIN-A MICRO PUMP) 0.06 % External Gel Apply 1 Application topically daily. 50 g 0    zolpidem (AMBIEN) 5 MG Oral Tab Take 1  tablet (5 mg total) by mouth nightly as needed for Sleep. 20 tablet 1    clotrimazole-betamethasone 1-0.05 % External Cream Use bid to affected areas of skin 45 g 2    ondansetron 4 MG Oral Tablet Dispersible 1-2 tabs as needed for nausea associated with migraine; no more than 6 tabs in 24 hours 30 tablet 1      Past Medical History:    Anxiety    Depression    H/O human papillomavirus infection    H/O spinal fusion    Hx of migraines    Scoliosis      Past Surgical History:   Procedure Laterality Date    Spinal fusion      thoracic lumbar with rods      Family History   Problem Relation Age of Onset    Other (Cancer glioblastoma) Father         CLL      Social History:   Social History     Socioeconomic History    Marital status: Single    Number of children: 0   Occupational History    Occupation:    Tobacco Use    Smoking status: Never    Smokeless tobacco: Never   Vaping Use    Vaping status: Never Used   Substance and Sexual Activity    Alcohol use: Yes     Alcohol/week: 2.0 - 3.0 standard drinks of alcohol     Types: 2 - 3 Glasses of wine per week     Comment: 3x/week    Drug use: No   Other Topics Concern    Caffeine Concern Yes     Comment: 1-2 cups pf coffee daily    Exercise Yes     Comment: walking twice a day    Right Handed Yes    Reaction to local anesthetic No          REVIEW OF SYSTEMS:   GENERAL: feels well otherwise, denies f/c  HEENT: + nasal congestion, denies sinus pain, + sore throat  LUNGS: denies shortness of breath with exertion, + cough  CARDIOVASCULAR: denies chest pain, pressure, or palpitations      EXAM:   /70   Pulse 76   Temp 98.3 °F (36.8 °C)   Ht 5' 4\" (1.626 m)   Wt 125 lb 6 oz (56.9 kg)   SpO2 99%   BMI 21.52 kg/m²     GENERAL: well developed, well nourished, in no apparent distress  HEENT: normal oropharynx, normal TM's b/l  NECK: supple, no cervical or supraclavicular LAD  LUNGS: clear to auscultation b/l, no w/r/r  CARDIO: RRR, normal S1S2, no  m/r/g  NEURO: A&O x 3, moves all 4 extremities spontaneously      ASSESSMENT AND PLAN:   Pricilla Andersen is a 38 year old female who presents for URI sx.    URI  Sore throat  - likely 2/2 URI, r/o strep given no improvement since onset   - continue conservative measures  - POC Rapid Strep [09380]  - Grp A Strep Cult, Throat [E]; Future  - Grp A Strep Cult, Throat [E]    Sinusitis  - continue azithromycin to completion  - medrol dose dylan sent to patient' pharmacy    For E/M code - 30 minutes spent reviewing performing chart review, obtaining a history, performing a physical exam, reviewing the assessment/plan, placing orders, and completing documentation.     Jacquelyn Hsieh DO  6/5/2024  2:13 PM

## 2024-06-11 ENCOUNTER — TELEPHONE (OUTPATIENT)
Dept: INTERNAL MEDICINE CLINIC | Facility: CLINIC | Age: 39
End: 2024-06-11

## 2024-06-12 NOTE — TELEPHONE ENCOUNTER
Patient returning our call, nurse unavailable at this time. Please call patient back.    Best call back number [phone]

## 2024-06-12 NOTE — TELEPHONE ENCOUNTER
To nursing staff, please relay the following to Pricilla Andersen:    Strep cx negative.    Thank you!

## 2024-07-08 ENCOUNTER — TELEPHONE (OUTPATIENT)
Age: 39
End: 2024-07-08

## 2024-07-08 NOTE — TELEPHONE ENCOUNTER
Hello,    Thank you for taking the time to connect with me this afternoon. As a reminder, I am reaching out from the [facility] Navigation department, following up on an order from your provider's office to assist in connecting you with resources for care. I know we discussed that you have already been connected with care, but if for any reason you would like to discuss this again in the future, please don't hesitate to give us a call back at [phone], or for more immediate assistance you can contact our 24-hour help line at [phone].

## 2024-07-18 ENCOUNTER — TELEPHONE (OUTPATIENT)
Dept: INTERNAL MEDICINE CLINIC | Facility: CLINIC | Age: 39
End: 2024-07-18

## 2024-07-18 NOTE — TELEPHONE ENCOUNTER
I called and spoke with pt and informed her that faxed current medication list to Osiris LUNA F# [phone] with fax confirmation received. Pt informed to request full medication history from MR dept. Pt voiced understanding and will do so.

## 2024-07-18 NOTE — TELEPHONE ENCOUNTER
Patient called to request a complete medication list of both current and previous medications be faxed over to [facility], Osiris Scott.    Fax#[phone]    Patient has a scheduled appointment with Calista Scott NP on 8/1/24.     Please call patient with any additional  questions. #[phone]

## 2024-08-27 ENCOUNTER — OFFICE VISIT (OUTPATIENT)
Dept: FAMILY MEDICINE CLINIC | Facility: CLINIC | Age: 39
End: 2024-08-27
Payer: COMMERCIAL

## 2024-08-27 VITALS
RESPIRATION RATE: 14 BRPM | HEIGHT: 64 IN | OXYGEN SATURATION: 100 % | TEMPERATURE: 100 F | WEIGHT: 128 LBS | DIASTOLIC BLOOD PRESSURE: 89 MMHG | SYSTOLIC BLOOD PRESSURE: 123 MMHG | BODY MASS INDEX: 21.85 KG/M2 | HEART RATE: 80 BPM

## 2024-08-27 DIAGNOSIS — J02.0 STREP THROAT: Primary | ICD-10-CM

## 2024-08-27 LAB
CONTROL LINE PRESENT WITH A CLEAR BACKGROUND (YES/NO): YES YES/NO
KIT LOT #: NORMAL NUMERIC
STREP GRP A CUL-SCR: POSITIVE

## 2024-08-27 PROCEDURE — 87880 STREP A ASSAY W/OPTIC: CPT | Performed by: NURSE PRACTITIONER

## 2024-08-27 PROCEDURE — 99202 OFFICE O/P NEW SF 15 MIN: CPT | Performed by: NURSE PRACTITIONER

## 2024-08-27 RX ORDER — AMOXICILLIN 500 MG/1
500 CAPSULE ORAL 2 TIMES DAILY
Qty: 20 CAPSULE | Refills: 0 | Status: SHIPPED | OUTPATIENT
Start: 2024-08-27 | End: 2024-09-06

## 2024-08-27 NOTE — PROGRESS NOTES
CHIEF COMPLAINT:     Chief Complaint   Patient presents with    Sore Throat     Pain while swallowing X 1 day        HPI:   Pricilla Andersen is a 38 year old female presents to clinic with symptoms of sore throat. Patient has had for 1 day. Symptoms have been persistent since onset.    Patient reports: painful swallowing, normal migraines days ago, white spot on throat.    Denies fever, chills, headache, stomach upset, nasal congestion, cough, ear pain, or rash.  Has + history of strep throat; No history of mono. No one is sick at home.  No known strep or mono exposure.   No COVID exposure   Treating symptoms with Advil.    Current Outpatient Medications   Medication Sig Dispense Refill    FLUoxetine 10 MG Oral Cap Take 1 capsule (10 mg total) by mouth daily. 90 capsule 0    ALPRAZolam (XANAX) 0.25 MG Oral Tab Take 1 tablet (0.25 mg total) by mouth daily as needed for Anxiety. 30 tablet 0    spironolactone 25 MG Oral Tab       methylPREDNISolone (MEDROL) 4 MG Oral Tablet Therapy Pack As directed. 21 each 0    azithromycin 250 MG Oral Tab 2 PO day one, one PO days two through five 6 tablet 0    albuterol (PROAIR HFA) 108 (90 Base) MCG/ACT Inhalation Aero Soln Inhale 2 puffs into the lungs every 6 (six) hours as needed for Wheezing. 1 each 11    Clindamycin Phos-Benzoyl Perox 1-5 % External Gel clindamycin 1 %-benzoyl peroxide 5 % topical gel with pump  BRIANA TOPICALLY AA BID 50 g 0    cetirizine 10 MG Oral Tab Take 1 tablet (10 mg total) by mouth daily.      Segesterone-Ethinyl Estradiol 0.15-0.013 MG/24HR Vaginal Ring Annovera 0.15 mg-0.013 mg/24 hr vaginal ring      Azelaic Acid (FINACEA) 15 % External Gel Use bid as directed to face 50 g 6    Tretinoin Microsphere (RETIN-A MICRO PUMP) 0.06 % External Gel Apply 1 Application topically daily. 50 g 0    zolpidem (AMBIEN) 5 MG Oral Tab Take 1 tablet (5 mg total) by mouth nightly as needed for Sleep. 20 tablet 1    clotrimazole-betamethasone 1-0.05 % External Cream  Use bid to affected areas of skin 45 g 2    ondansetron 4 MG Oral Tablet Dispersible 1-2 tabs as needed for nausea associated with migraine; no more than 6 tabs in 24 hours 30 tablet 1      Past Medical History:    Anxiety    Depression    H/O human papillomavirus infection    H/O spinal fusion    Hx of migraines    Scoliosis      Social History:  Social History     Socioeconomic History    Marital status: Single    Number of children: 0   Occupational History    Occupation:    Tobacco Use    Smoking status: Never    Smokeless tobacco: Never   Vaping Use    Vaping status: Never Used   Substance and Sexual Activity    Alcohol use: Yes     Alcohol/week: 2.0 - 3.0 standard drinks of alcohol     Types: 2 - 3 Glasses of wine per week     Comment: 3x/week    Drug use: No   Other Topics Concern    Caffeine Concern Yes     Comment: 1-2 cups pf coffee daily    Exercise Yes     Comment: walking twice a day    Right Handed Yes    Reaction to local anesthetic No        REVIEW OF SYSTEMS:   GENERAL HEALTH:  See HPI  SKIN: denies any unusual skin lesions or rashes  HEENT: denies ear pain, See HPI  RESPIRATORY: denies shortness of breath or wheezing  CARDIOVASCULAR: denies chest pain, palpitations   GI: denies abdominal pain, constipation and diarrhea  NEURO: denies dizziness or lightheadedness    EXAM:   /89   Pulse 80   Temp 100.1 °F (37.8 °C) (Temporal)   Resp 14   Ht 5' 4\" (1.626 m)   Wt 128 lb (58.1 kg)   LMP 08/03/2024   SpO2 100%   BMI 21.97 kg/m²   GENERAL: well developed, well nourished, in no apparent distress  SKIN: no rashes,no suspicious lesions  HEAD: atraumatic, normocephalic  EYES: conjunctiva clear  EARS: TM's clear, non-injected, no bulging, retraction, or fluid  NOSE: nostrils patent, No nasal mucus, nasal mucosa pink and noninflamed  THROAT: oral mucosa pink, moist. Posterior pharynx erythematous and injected. White exudates. Tonsils 2/4.  Uvula is midline.  No trismus, hoarseness,  muffled voice, or stridor.    NECK: supple  LUNGS: clear to auscultation bilaterally. Breathing is non labored.  CARDIO: RRR without murmur  GI: good BS's, no masses, hepatosplenomegaly, or tenderness on direct palpation  EXTREMITIES: no cyanosis, clubbing or edema  LYMPH: + anterior cervical and submandibular lymphadenopathy.  No posterior cervical or occipital lymphadenopathy.    Recent Results (from the past 24 hour(s))   Strep A Assay W/Optic    Collection Time: 08/27/24  8:22 AM   Result Value Ref Range    Strep Grp A Screen positive Negative    Control Line Present with a clear background (yes/no) yes Yes/No    Kit Lot # [id number] Numeric    Kit Expiration Date 5/21/2025 Date       ASSESSMENT AND PLAN:   Assessment:   Encounter Diagnosis   Name Primary?    Strep throat Yes         Plan:  Meds and instructions as listed below. Risks, benefits, complications and side effects of meds discussed with patient.   OTC Tylenol/Motrin prn. Push fluids; warm salt water gargles. Change tooth brush two days into therapy. Yogurt/probiotic recommended.   Follow up in 3-5 days if not improving, condition worsens, or fever greater than or equal to 100.4 persists for 72 hours.    Understanding verbalized.    Meds & Refills for this Visit:  Requested Prescriptions     Signed Prescriptions Disp Refills    amoxicillin 500 MG Oral Cap 20 capsule 0     Sig: Take 1 capsule (500 mg total) by mouth 2 (two) times daily for 10 days. Take with food           Patient Instructions   You are considered to be contagious until you have been on antibiotics for 24 hours.   You can return to school and/or work once on antibiotics for 24 hours.   Can use over the counter Tylenol/Ibuprofen as needed.  Push fluids- warm or cool liquids, whichever is soothing for patient  Change tooth brush two days into antibiotic therapy.   Warm salt water gargles 2 times per day for at least 3 days.  Do not share utensils or drinks with anyone.  Follow up in 3-5  days if not improving, condition worsens, or fever greater than or equal to 100.4 persists for 72 hours.    Be sure to finish entire course of antibiotics to reduce risk of reinfection or antibiotic resistance   .

## 2024-08-27 NOTE — PATIENT INSTRUCTIONS
You are considered to be contagious until you have been on antibiotics for 24 hours.   You can return to school and/or work once on antibiotics for 24 hours.   Can use over the counter Tylenol/Ibuprofen as needed.  Push fluids- warm or cool liquids, whichever is soothing for patient  Change tooth brush two days into antibiotic therapy.   Warm salt water gargles 2 times per day for at least 3 days.  Do not share utensils or drinks with anyone.  Follow up in 3-5 days if not improving, condition worsens, or fever greater than or equal to 100.4 persists for 72 hours.    Be sure to finish entire course of antibiotics to reduce risk of reinfection or antibiotic resistance

## 2024-09-19 ENCOUNTER — TELEPHONE (OUTPATIENT)
Dept: INTERNAL MEDICINE CLINIC | Facility: CLINIC | Age: 39
End: 2024-09-19

## 2024-09-19 ENCOUNTER — PATIENT MESSAGE (OUTPATIENT)
Dept: INTERNAL MEDICINE CLINIC | Facility: CLINIC | Age: 39
End: 2024-09-19

## 2024-09-19 DIAGNOSIS — R35.0 FREQUENCY OF URINATION: Primary | ICD-10-CM

## 2024-09-19 DIAGNOSIS — Z00.00 ANNUAL PHYSICAL EXAM: ICD-10-CM

## 2024-09-19 NOTE — TELEPHONE ENCOUNTER
To Dr LUNA,    I spoke with pt and triaged her as below.Pt will provide UA specimen today.    Pt asking what labs you will order for her at this time. Pt 's psychiatrist would like some fasting labs.     [id number] - [place], [place] - [address] AT [facility] [address], [phone], [phone]   [address] [facility] [address]   Phone: [phone] Fax: [phone]     [x]Frequency  []Urgency  []Pain/burning  []Blood in urine, If yes: [ ]use of anticoagulants  []Low back pain  []Flank pain  [x]Pelvic/bladder pressure  []Fevers  []chills  []Odor  []Cloudy Urine  []Confusion  []Incomplete bladder emptying    []Incontinent bladder    []OTC medication    Amendable to presenting to the lab to provide a urine specimen: [x]Yes [[]No      NOTES:    Date of symptom onset: 9-16

## 2024-09-19 NOTE — TELEPHONE ENCOUNTER
I signed off on urine and urine culture.    Please ask Eric if she would like to start an antibiotic prior to knowing the results.  The results of the urinalysis usually take 24 hours and culture usually 72 hours.    Sometimes after the urine tests are submitted we can start an antibiotic empirically    In terms of her psychiatrist wanting \"some fasting labs\".  Please ask if the psychiatrist has anything specific otherwise I can order CBC, CMP, lipids and TSH\" under \"annual physical\".

## 2024-09-19 NOTE — TELEPHONE ENCOUNTER
From: Pricilla Andersen  To: Jacquelyn Hsieh  Sent: 9/19/2024 9:59 AM CDT  Subject: Lab Work requests    Hi Dr. Hsieh,    I was wondering if it would be possible for you to order labwork for me?     First priority would be to check if I have a UTI. I've had some discomfort peeing this week.    Next, my psychiatrist would like some fasting labs. I think my last set were last October, so I'd like to do them in October again.    If I need to come in for any of this, just let me know.    thanks,  Pricilla

## 2024-09-19 NOTE — TELEPHONE ENCOUNTER
----- Message from [employer]  sent at 9/19/2024  9:59 AM CDT -----  Regarding: Lab Work requests  Contact: [phone]  Toy Hsieh,    I was wondering if it would be possible for you to order labwork for me?     First priority would be to check if I have a UTI. I've had some discomfort peeing this week.    Next, my psychiatrist would like some fasting labs. I think my last set were last October, so I'd like to do them in October again.    If I need to come in for any of this, just let me know.    thanks,  Pricilla

## 2024-09-19 NOTE — TELEPHONE ENCOUNTER
To  - called patient who does ot want to start antibiotics before results return ( she just had strep)   Other labs are fine - nothing special that psychiatrist wants- labs pending thanks

## 2024-09-21 ENCOUNTER — LAB ENCOUNTER (OUTPATIENT)
Dept: LAB | Facility: HOSPITAL | Age: 39
End: 2024-09-21
Attending: INTERNAL MEDICINE
Payer: COMMERCIAL

## 2024-09-21 ENCOUNTER — TELEPHONE (OUTPATIENT)
Dept: INTERNAL MEDICINE CLINIC | Facility: CLINIC | Age: 39
End: 2024-09-21

## 2024-09-21 ENCOUNTER — MOBILE ENCOUNTER (OUTPATIENT)
Dept: INTERNAL MEDICINE CLINIC | Facility: CLINIC | Age: 39
End: 2024-09-21

## 2024-09-21 DIAGNOSIS — N39.0 URINARY TRACT INFECTION WITHOUT HEMATURIA, SITE UNSPECIFIED: Primary | ICD-10-CM

## 2024-09-21 LAB
BILIRUB UR QL STRIP.AUTO: NEGATIVE
CLARITY UR REFRACT.AUTO: CLEAR
COLOR UR AUTO: COLORLESS
GLUCOSE UR STRIP.AUTO-MCNC: NORMAL MG/DL
KETONES UR STRIP.AUTO-MCNC: NEGATIVE MG/DL
LEUKOCYTE ESTERASE UR QL STRIP.AUTO: 75
NITRITE UR QL STRIP.AUTO: NEGATIVE
PH UR STRIP.AUTO: 6 [PH] (ref 5–8)
PROT UR STRIP.AUTO-MCNC: NEGATIVE MG/DL
SP GR UR STRIP.AUTO: 1 (ref 1–1.03)
UROBILINOGEN UR STRIP.AUTO-MCNC: NORMAL MG/DL

## 2024-09-21 PROCEDURE — 99442 PHONE E/M BY PHYS 11-20 MIN: CPT | Performed by: INTERNAL MEDICINE

## 2024-09-21 PROCEDURE — 87077 CULTURE AEROBIC IDENTIFY: CPT | Performed by: INTERNAL MEDICINE

## 2024-09-21 PROCEDURE — 81001 URINALYSIS AUTO W/SCOPE: CPT | Performed by: INTERNAL MEDICINE

## 2024-09-21 PROCEDURE — 87086 URINE CULTURE/COLONY COUNT: CPT | Performed by: INTERNAL MEDICINE

## 2024-09-21 RX ORDER — NITROFURANTOIN 25; 75 MG/1; MG/1
100 CAPSULE ORAL 2 TIMES DAILY
Qty: 14 CAPSULE | Refills: 0 | Status: SHIPPED | OUTPATIENT
Start: 2024-09-21

## 2024-09-23 NOTE — TELEPHONE ENCOUNTER
We can let Lakisha know that I did look up the question of Macrobid covering her Streptococcus agalactiae and it does cover it so she should continue the Macrobid and finish it.    I left order for repeat urinalysis and urine culture a few days after she is done with treatment.

## 2024-09-23 NOTE — TELEPHONE ENCOUNTER
Patient is calling she saw the results of her UA from 9/21 are ready    Patient is calling to ask if she needs a different antibiotic?    Please call patient [phone]

## 2024-09-28 NOTE — TELEPHONE ENCOUNTER
Virtual Visit/Telephone Note    Pricilla Andersen verbally consents to a Virtual/Telephone Check-In service on 24  Patient understands and accepts financial responsibility for any deductible, co-insurance and/or co-pays associated with this service.    Duration/time spent of the service: 20 Minutes of direct patient contact.  10 Minutes of chart review, documentation, medical decision making.    HPI:   Pricilla Andersen is a 38 year old female who presents for complains of: UTI  UTI: Patient has complaints of dysuria and UTI for approximately 48 hours, she claims she has not changed her usual activities, or products, and started with some dysuria.  Is noticing a foul smell to the urine as well, and it does appear cloudy, she has had ear infections in the past, and is concerned she has a repeat.  She is unable to provide a sample at this point in time due to her location, we did discuss empiric options, she is interested in treatment    Physical exam:   Telephone visit only, no obvious pain no obvious shortness of breath, patient sounds been her usual state of health    ASSESSMENT AND PLAN:   Pricilla Andersen is a 38 year old female who presents with the followin. Urinary tract infection without hematuria, site unspecified  Macrobid 100 mg twice daily for 7 days, present to the emergency room, or submit a urine sample if possible, if symptoms are not resolving or worsening, notify on-call physician or the office during regular business hours.  Recommended follow-up with primary physician in the near future, she verbalized understanding.      Jeff Anthony D'Amico,   2024  7:53 PM    Spent 30 minutes obtaining history, evaluating patient, discussing treatment options, diet, exercise, review of available labs and radiology reports, and completing documentation.

## 2024-09-30 ENCOUNTER — LAB ENCOUNTER (OUTPATIENT)
Dept: LAB | Age: 39
End: 2024-09-30
Attending: INTERNAL MEDICINE
Payer: COMMERCIAL

## 2024-09-30 DIAGNOSIS — R35.0 FREQUENCY OF URINATION: ICD-10-CM

## 2024-09-30 LAB
BILIRUB UR QL: NEGATIVE
CLARITY UR: CLEAR
COLOR UR: YELLOW
GLUCOSE UR-MCNC: NORMAL MG/DL
KETONES UR-MCNC: NEGATIVE MG/DL
LEUKOCYTE ESTERASE UR QL STRIP.AUTO: NEGATIVE
NITRITE UR QL STRIP.AUTO: NEGATIVE
PH UR: 6 [PH] (ref 5–8)
PROT UR-MCNC: NEGATIVE MG/DL
SP GR UR STRIP: 1.02 (ref 1–1.03)
UROBILINOGEN UR STRIP-ACNC: NORMAL

## 2024-09-30 PROCEDURE — 81001 URINALYSIS AUTO W/SCOPE: CPT

## 2024-09-30 PROCEDURE — 87086 URINE CULTURE/COLONY COUNT: CPT

## 2024-10-03 ENCOUNTER — OFFICE VISIT (OUTPATIENT)
Dept: INTERNAL MEDICINE CLINIC | Facility: CLINIC | Age: 39
End: 2024-10-03

## 2024-10-03 VITALS
SYSTOLIC BLOOD PRESSURE: 122 MMHG | HEIGHT: 64 IN | WEIGHT: 124 LBS | RESPIRATION RATE: 16 BRPM | OXYGEN SATURATION: 99 % | HEART RATE: 77 BPM | DIASTOLIC BLOOD PRESSURE: 74 MMHG | TEMPERATURE: 99 F | BODY MASS INDEX: 21.17 KG/M2

## 2024-10-03 DIAGNOSIS — Z00.00 ROUTINE HEALTH MAINTENANCE: ICD-10-CM

## 2024-10-03 DIAGNOSIS — R82.90 ABNORMAL URINALYSIS: ICD-10-CM

## 2024-10-03 DIAGNOSIS — R10.2 PELVIC PAIN: ICD-10-CM

## 2024-10-03 DIAGNOSIS — Z86.69 HISTORY OF MIGRAINE: ICD-10-CM

## 2024-10-03 DIAGNOSIS — Z00.00 ANNUAL PHYSICAL EXAM: Primary | ICD-10-CM

## 2024-10-03 PROCEDURE — 99395 PREV VISIT EST AGE 18-39: CPT | Performed by: INTERNAL MEDICINE

## 2024-10-03 RX ORDER — DICLOFENAC POTASSIUM 50 MG/1
50 TABLET, FILM COATED ORAL AS DIRECTED
COMMUNITY
Start: 2024-09-05

## 2024-10-03 RX ORDER — VENLAFAXINE 37.5 MG/1
37.5 TABLET ORAL DAILY
COMMUNITY
End: 2024-10-03

## 2024-10-03 RX ORDER — SUMATRIPTAN 3 MG/1
0.5 INJECTION, SOLUTION SUBCUTANEOUS AS NEEDED
COMMUNITY
Start: 2024-10-01

## 2024-10-03 RX ORDER — VENLAFAXINE HYDROCHLORIDE 37.5 MG/1
37.5 CAPSULE, EXTENDED RELEASE ORAL DAILY
COMMUNITY
Start: 2024-09-19

## 2024-10-03 RX ORDER — DESOGESTREL AND ETHINYL ESTRADIOL 0.15-0.03
1 KIT ORAL DAILY
COMMUNITY
End: 2024-10-03

## 2024-10-03 NOTE — PROGRESS NOTES
Pricilla Andersen is a 38 year old female.  HPI:     Chief Complaint   Patient presents with    Physical     ANNUAL PHYSICAL, Discuss increasing frequency of migraine and repeat U/a for recent UTI        Pricilla presents for annual physical    She has been battling migraines.  She is to get a couple or so a month but now they are happening more frequently may be once a week for the last 6 to 8 weeks.  She is seeing a neurologist.  .  She has no focal symptoms.  The headache started on the top of the head.  She gets photophobic.  She does get nausea.  There is some vision changes.  She is now Zembrace when she does get a migraine they are quite severe.  She rates it as a 9/10..  They will last all day and the following day she is fatigued.    She had been on Prozac.  However this is felt to have caused an increase in the frequency of her migraines.    She is now on Effexor 37 and half milligrams daily    She sees Osiris Zayas advanced nurse practitioner who has prescribed the Effexor..  She did also sees a therapist.    We did talk about anxiety.  She does have some stressors at work.    She is thinking about taking a month or 2 off to pursue other projects.    She recently had Streptococcus agalactiae, UTI.  She had some dysuria with voiding.  She did get a course of Macrobid.  A repeat urine analysis showed some microscopic materia but this was done during her menses.    She will get updated labs along with repeat urine and urine culture.  However we will do this in about a week as she just finished her menses    We talked about flu vaccine and COVID booster but she wishes to wait a little bit and let the Effexor take effect before getting any vaccines.  I did think this is reasonable.    Other than that she has had no cardiac pulmonary or GI problems    The Macrobid did seem to help her voiding but she does have some intravaginal discomfort and we discussed seeing her gynecologist.  [facility] and will follow-up with them for her intravaginal discomfort.    Family history is remarkable for her father having a gluteal blastoma multiforme and passed away at age 70.  Her grandmother had a brain cancer but we do not know what type.  We did talk about brain imaging.  There is no focal neurological signs.  However she will let me know if she wishes to pursue this or she can discuss this with her neurologist   Current Outpatient Medications   Medication Sig Dispense Refill    diclofenac 50 MG Oral Tab Take 1 tablet (50 mg total) by mouth As Directed.      ZEMBRACE SYMTOUCH 3 MG/0.5ML Subcutaneous Solution Auto-injector Inject 0.5 mL into the skin as needed.      venlafaxine ER 37.5 MG Oral Capsule SR 24 Hr Take 1 capsule (37.5 mg total) by mouth daily.      ALPRAZolam (XANAX) 0.25 MG Oral Tab Take 1 tablet (0.25 mg total) by mouth daily as needed for Anxiety. 30 tablet 0    albuterol (PROAIR HFA) 108 (90 Base) MCG/ACT Inhalation Aero Soln Inhale 2 puffs into the lungs every 6 (six) hours as needed for Wheezing. 1 each 11    Clindamycin Phos-Benzoyl Perox 1-5 % External Gel clindamycin 1 %-benzoyl peroxide 5 % topical gel with pump  BRIANA TOPICALLY AA BID 50 g 0    ondansetron 4 MG Oral Tablet Dispersible 1-2 tabs as needed for nausea associated with migraine; no more than 6 tabs in 24 hours 30 tablet 1    Desogestrel-Ethinyl Estradiol (ENSKYCE) 0.15-30 MG-MCG Oral Tab Take 1 tablet by mouth daily. (Patient not taking: Reported on 10/3/2024)      cetirizine 10 MG Oral Tab Take 1 tablet (10 mg total) by mouth daily. (Patient not taking: Reported on 8/27/2024)      Segesterone-Ethinyl Estradiol 0.15-0.013 MG/24HR Vaginal Ring Annovera 0.15 mg-0.013 mg/24 hr vaginal ring (Patient not taking: Reported on 10/3/2024)      Azelaic Acid (FINACEA) 15 % External Gel Use bid as directed to face (Patient not taking: Reported on 10/3/2024) 50 g 6    zolpidem (AMBIEN) 5 MG Oral Tab Take 1  tablet (5 mg total) by mouth nightly as needed for Sleep. (Patient not taking: Reported on 10/3/2024) 20 tablet 1    clotrimazole-betamethasone 1-0.05 % External Cream Use bid to affected areas of skin (Patient not taking: Reported on 10/3/2024) 45 g 2      Past Medical History:    Anxiety    Depression    H/O human papillomavirus infection    H/O spinal fusion    Hx of migraines    Scoliosis      Social History:  Social History     Socioeconomic History    Marital status: Single    Number of children: 0   Occupational History    Occupation:    Tobacco Use    Smoking status: Never    Smokeless tobacco: Never   Vaping Use    Vaping status: Never Used   Substance and Sexual Activity    Alcohol use: Yes     Alcohol/week: 2.0 - 3.0 standard drinks of alcohol     Types: 2 - 3 Glasses of wine per week     Comment: 3x/week    Drug use: No   Other Topics Concern    Caffeine Concern Yes     Comment: 1-2 cups pf coffee daily    Exercise Yes     Comment: walking twice a day    Right Handed Yes    Reaction to local anesthetic No        REVIEW OF SYSTEMS:   GENERAL HEALTH:  feels well otherwise  RESPIRATORY:  Voices no shortness of breath with exertion or cough  CARDIOVASCULAR:  Voices no chest pain on exertion or shortness of breath  GI:   Voices no abdominal pain or changes of bowels   : see above.  Today she does have some intravaginal discomfort but no obvious voiding difficulties  NEURO:  see above    EXAM:   /74   Pulse 77   Temp 98.5 °F (36.9 °C) (Oral)   Resp 16   Ht 5' 4\" (1.626 m)   Wt 124 lb (56.2 kg)   LMP 09/27/2024   SpO2 99%   BMI 21.28 kg/m²     GENERAL:  well developed, well nourished, in no apparent distress  SKIN:  no rashes , no suspicious lesions  HEENT: atraumatic.   Pharynx normal without exudate.  EYES:  PERRL. Sclera anicteric.  NECK:  Supple,  no adenopathy,  thyroid normal  LUNGS:  clear to auscultation.  Effort normal  CARDIO:  RRR without murmur.   S1 and S2 normal  GI:   good BS's,  no masses,   HSM or tenderness  EXTREMITIES : no cyanosis, clubbing or edema  NEURO: No facial asymmetry.  Motor power symmetric and good in her arms and legs.  DTRs knees 2+ bilaterally.    ASSESSMENT AND PLAN:     1. Annual physical exam  Annual physical exam.    2. Routine health maintenance  We did talk about availability of flu and COVID vaccines    3. Abnormal urinalysis  Recent Streptococcus agalactiae, treated with antibiotics.  Recent urinalysis shows microscopic hematuria although she did have her menses.  Repeat urine and urine culture next week.    4. History of migraine  Lakisha is battling a reoccurrence of frequent migraines.  She is under the care of Dr. Rhodes from neurology.  On Zembrace.    5. Pelvic pain  She will see gynecology    This visit was 30 minutes.  I spent 10 minutes before visit preparing and reviewing old records.  Greater than 50% of the visit was engaged in counseling and review of past data.     The patient indicates understanding of these issues and agrees to the plan.    Mulugeta Pedroza MD  10/3/2024  1:43 PM

## 2024-10-10 ENCOUNTER — LAB ENCOUNTER (OUTPATIENT)
Dept: LAB | Age: 39
End: 2024-10-10
Attending: INTERNAL MEDICINE
Payer: COMMERCIAL

## 2024-10-10 DIAGNOSIS — R82.90 ABNORMAL URINALYSIS: ICD-10-CM

## 2024-10-10 DIAGNOSIS — Z00.00 ANNUAL PHYSICAL EXAM: ICD-10-CM

## 2024-10-10 LAB
ALBUMIN SERPL-MCNC: 4.8 G/DL (ref 3.2–4.8)
ALBUMIN/GLOB SERPL: 2.1 {RATIO} (ref 1–2)
ALP LIVER SERPL-CCNC: 75 U/L
ALT SERPL-CCNC: 9 U/L
ANION GAP SERPL CALC-SCNC: 6 MMOL/L (ref 0–18)
AST SERPL-CCNC: 16 U/L (ref ?–34)
BASOPHILS # BLD AUTO: 0.03 X10(3) UL (ref 0–0.2)
BASOPHILS NFR BLD AUTO: 0.6 %
BILIRUB SERPL-MCNC: 0.8 MG/DL (ref 0.3–1.2)
BILIRUB UR QL: NEGATIVE
BUN BLD-MCNC: 9 MG/DL (ref 9–23)
BUN/CREAT SERPL: 11.7 (ref 10–20)
CALCIUM BLD-MCNC: 9.5 MG/DL (ref 8.7–10.4)
CHLORIDE SERPL-SCNC: 105 MMOL/L (ref 98–112)
CHOLEST SERPL-MCNC: 218 MG/DL (ref ?–200)
CLARITY UR: CLEAR
CO2 SERPL-SCNC: 27 MMOL/L (ref 21–32)
COLOR UR: COLORLESS
CREAT BLD-MCNC: 0.77 MG/DL
DEPRECATED RDW RBC AUTO: 38.8 FL (ref 35.1–46.3)
EGFRCR SERPLBLD CKD-EPI 2021: 102 ML/MIN/1.73M2 (ref 60–?)
EOSINOPHIL # BLD AUTO: 0.08 X10(3) UL (ref 0–0.7)
EOSINOPHIL NFR BLD AUTO: 1.6 %
ERYTHROCYTE [DISTWIDTH] IN BLOOD BY AUTOMATED COUNT: 11.9 % (ref 11–15)
FASTING PATIENT LIPID ANSWER: YES
FASTING STATUS PATIENT QL REPORTED: YES
GLOBULIN PLAS-MCNC: 2.3 G/DL (ref 2–3.5)
GLUCOSE BLD-MCNC: 88 MG/DL (ref 70–99)
GLUCOSE UR-MCNC: NORMAL MG/DL
HCT VFR BLD AUTO: 42.1 %
HDLC SERPL-MCNC: 78 MG/DL (ref 40–59)
HGB BLD-MCNC: 14 G/DL
HGB UR QL STRIP.AUTO: NEGATIVE
IMM GRANULOCYTES # BLD AUTO: 0.01 X10(3) UL (ref 0–1)
IMM GRANULOCYTES NFR BLD: 0.2 %
KETONES UR-MCNC: NEGATIVE MG/DL
LDLC SERPL CALC-MCNC: 127 MG/DL (ref ?–100)
LEUKOCYTE ESTERASE UR QL STRIP.AUTO: NEGATIVE
LYMPHOCYTES # BLD AUTO: 1.72 X10(3) UL (ref 1–4)
LYMPHOCYTES NFR BLD AUTO: 34.5 %
MCH RBC QN AUTO: 29.5 PG (ref 26–34)
MCHC RBC AUTO-ENTMCNC: 33.3 G/DL (ref 31–37)
MCV RBC AUTO: 88.6 FL
MONOCYTES # BLD AUTO: 0.41 X10(3) UL (ref 0.1–1)
MONOCYTES NFR BLD AUTO: 8.2 %
NEUTROPHILS # BLD AUTO: 2.73 X10 (3) UL (ref 1.5–7.7)
NEUTROPHILS # BLD AUTO: 2.73 X10(3) UL (ref 1.5–7.7)
NEUTROPHILS NFR BLD AUTO: 54.9 %
NITRITE UR QL STRIP.AUTO: NEGATIVE
NONHDLC SERPL-MCNC: 140 MG/DL (ref ?–130)
OSMOLALITY SERPL CALC.SUM OF ELEC: 284 MOSM/KG (ref 275–295)
PH UR: 7 [PH] (ref 5–8)
PLATELET # BLD AUTO: 236 10(3)UL (ref 150–450)
POTASSIUM SERPL-SCNC: 4 MMOL/L (ref 3.5–5.1)
PROT SERPL-MCNC: 7.1 G/DL (ref 5.7–8.2)
PROT UR-MCNC: NEGATIVE MG/DL
RBC # BLD AUTO: 4.75 X10(6)UL
SODIUM SERPL-SCNC: 138 MMOL/L (ref 136–145)
SP GR UR STRIP: 1.01 (ref 1–1.03)
TRIGL SERPL-MCNC: 74 MG/DL (ref 30–149)
TSI SER-ACNC: 1.64 MIU/ML (ref 0.55–4.78)
UROBILINOGEN UR STRIP-ACNC: NORMAL
VIT D+METAB SERPL-MCNC: 33.3 NG/ML (ref 30–100)
VLDLC SERPL CALC-MCNC: 13 MG/DL (ref 0–30)
WBC # BLD AUTO: 5 X10(3) UL (ref 4–11)

## 2024-10-10 PROCEDURE — 84443 ASSAY THYROID STIM HORMONE: CPT

## 2024-10-10 PROCEDURE — 82306 VITAMIN D 25 HYDROXY: CPT

## 2024-10-10 PROCEDURE — 85025 COMPLETE CBC W/AUTO DIFF WBC: CPT

## 2024-10-10 PROCEDURE — 87086 URINE CULTURE/COLONY COUNT: CPT

## 2024-10-10 PROCEDURE — 81003 URINALYSIS AUTO W/O SCOPE: CPT

## 2024-10-10 PROCEDURE — 80061 LIPID PANEL: CPT

## 2024-10-10 PROCEDURE — 80053 COMPREHEN METABOLIC PANEL: CPT

## 2024-10-10 PROCEDURE — 36415 COLL VENOUS BLD VENIPUNCTURE: CPT

## 2024-10-11 ENCOUNTER — TELEPHONE (OUTPATIENT)
Dept: INTERNAL MEDICINE CLINIC | Facility: CLINIC | Age: 39
End: 2024-10-11

## 2024-10-11 NOTE — TELEPHONE ENCOUNTER
Please call Eric and let her know that I thought her test results came out good.    Her blood count is good without any anemia    Her chemistries were good without any evidence of diabetes liver or kidney problems    Her cholesterol was 218 but she has a lot of \"good\" cholesterol with her elevated HDL.  The LDL which is the \"bad\" cholesterol is a little bit elevated at 127.  For now she is needs to watch her high cholesterol foods such as marbled meats and dairy products.    Her thyroid and vitamin D levels were good.    Urine culture is pending but I suspect it is going to be fine.    All in all I am very pleased with her results.

## 2025-01-14 ENCOUNTER — OFFICE VISIT (OUTPATIENT)
Dept: INTERNAL MEDICINE CLINIC | Facility: CLINIC | Age: 40
End: 2025-01-14

## 2025-01-14 VITALS
SYSTOLIC BLOOD PRESSURE: 102 MMHG | TEMPERATURE: 99 F | WEIGHT: 128.81 LBS | BODY MASS INDEX: 21.99 KG/M2 | HEART RATE: 87 BPM | DIASTOLIC BLOOD PRESSURE: 78 MMHG | HEIGHT: 64 IN | OXYGEN SATURATION: 99 %

## 2025-01-14 DIAGNOSIS — L70.0 ACNE VULGARIS: ICD-10-CM

## 2025-01-14 DIAGNOSIS — Z23 FLU VACCINE NEED: ICD-10-CM

## 2025-01-14 DIAGNOSIS — Z86.69 HISTORY OF MIGRAINE: ICD-10-CM

## 2025-01-14 DIAGNOSIS — Z00.00 ROUTINE HEALTH MAINTENANCE: ICD-10-CM

## 2025-01-14 DIAGNOSIS — Z01.419 ENCOUNTER FOR GYNECOLOGICAL EXAMINATION WITHOUT ABNORMAL FINDING: ICD-10-CM

## 2025-01-14 DIAGNOSIS — Z87.42 HISTORY OF PCOS: ICD-10-CM

## 2025-01-14 DIAGNOSIS — Z00.00 ANNUAL PHYSICAL EXAM: Primary | ICD-10-CM

## 2025-01-14 PROCEDURE — 99395 PREV VISIT EST AGE 18-39: CPT | Performed by: INTERNAL MEDICINE

## 2025-01-14 PROCEDURE — 90471 IMMUNIZATION ADMIN: CPT | Performed by: INTERNAL MEDICINE

## 2025-01-14 PROCEDURE — 90656 IIV3 VACC NO PRSV 0.5 ML IM: CPT | Performed by: INTERNAL MEDICINE

## 2025-01-14 RX ORDER — TRETINOIN 0.25 MG/G
1 GEL TOPICAL DAILY PRN
COMMUNITY
Start: 2024-11-05

## 2025-01-14 RX ORDER — AMITRIPTYLINE HYDROCHLORIDE 10 MG/1
10 TABLET ORAL NIGHTLY
COMMUNITY
End: 2025-01-14

## 2025-01-14 RX ORDER — UBROGEPANT 100 MG/1
100 TABLET ORAL AS NEEDED
COMMUNITY
Start: 2024-10-29

## 2025-01-14 RX ORDER — CLINDAMYCIN AND BENZOYL PEROXIDE 10; 50 MG/G; MG/G
GEL TOPICAL
Qty: 50 G | Refills: 0 | Status: SHIPPED | OUTPATIENT
Start: 2025-01-14

## 2025-01-14 RX ORDER — AMITRIPTYLINE HYDROCHLORIDE 10 MG/1
10 TABLET ORAL NIGHTLY
Qty: 90 TABLET | Refills: 3 | Status: SHIPPED | OUTPATIENT
Start: 2025-01-14

## 2025-01-14 NOTE — PROGRESS NOTES
Pricilla Andersen is a 39 year old female.  HPI:     Chief Complaint   Patient presents with    Checkup     F/U on headaches   Need referral for neurologist      Pricilla presents for annual physical.    She is doing well.  She does have a history of migraines.  They are under control now with Ubrelvy which she will take when she thinks she is going to get a headache and if the headache does not resolve she will take Zembrace.    She did see a neurologist at the [facility].  She did have an MRI scan that she indicates was okay.  She wishes a referral to neurology with her new insurance.  I did generate referral to Dr. Tapia    I also generated referral to Dr. Bray which she wishes to see for acne.    I also gave referral to gynecology.    We talked about flu vaccine.    We also talked about availability of COVID booster    She is on Elavil for her headaches chronically and I did renew this for her.    She has no acute symptoms such as cardiac pulmonary GI or  symptoms    I did review her labs from October.  Cholesterol just mildly elevated at 218.  HDL 78 and .    All in all I think Pricilla is doing very well.  Current Outpatient Medications   Medication Sig Dispense Refill    UBRELVY 100 MG Oral Tab Take 100 mg by mouth as needed.      Tretinoin 0.025 % External Gel Apply 1 Application topically daily as needed.      amitriptyline 10 MG Oral Tab Take 1 tablet (10 mg total) by mouth nightly. 90 tablet 3    Clindamycin Phos-Benzoyl Perox 1-5 % External Gel clindamycin 1 %-benzoyl peroxide 5 % topical gel with pump  BRIANA TOPICALLY AA BID 50 g 0    ZEMBRACE SYMTOUCH 3 MG/0.5ML Subcutaneous Solution Auto-injector Inject 0.5 mL into the skin as needed.      ALPRAZolam (XANAX) 0.25 MG Oral Tab Take 1 tablet (0.25 mg total) by mouth daily as needed for Anxiety. 30 tablet 0    albuterol (PROAIR HFA) 108 (90 Base) MCG/ACT Inhalation Aero Soln Inhale 2 puffs into the lungs every 6 (six) hours as needed  for Wheezing. 1 each 11    ondansetron 4 MG Oral Tablet Dispersible 1-2 tabs as needed for nausea associated with migraine; no more than 6 tabs in 24 hours 30 tablet 1      Past Medical History:    Anxiety    Depression    H/O human papillomavirus infection    H/O spinal fusion    Hx of migraines    Scoliosis      Social History:  Social History     Socioeconomic History    Marital status: Single    Number of children: 0   Occupational History    Occupation:    Tobacco Use    Smoking status: Never    Smokeless tobacco: Never   Vaping Use    Vaping status: Never Used   Substance and Sexual Activity    Alcohol use: Yes     Alcohol/week: 2.0 - 3.0 standard drinks of alcohol     Types: 2 - 3 Glasses of wine per week     Comment: 3x/week    Drug use: No   Other Topics Concern    Caffeine Concern Yes     Comment: 1-2 cups pf coffee daily    Exercise Yes     Comment: walking twice a day    Right Handed Yes    Reaction to local anesthetic No        REVIEW OF SYSTEMS:   GENERAL HEALTH:  feels well otherwise  RESPIRATORY:  Voices no shortness of breath with exertion or cough  CARDIOVASCULAR:  Voices no chest pain on exertion or shortness of breath  GI:   Voices no abdominal pain or changes of bowels   :Viices no urning or frequency of urination.  NEURO:  Voices no  headaches or dizziness acutely.    EXAM:   /78 (BP Location: Right arm, Patient Position: Sitting, Cuff Size: adult)   Pulse 87   Temp 98.6 °F (37 °C) (Oral)   Ht 5' 4\" (1.626 m)   Wt 128 lb 12.8 oz (58.4 kg)   LMP 01/13/2025   SpO2 99%   BMI 22.11 kg/m²     GENERAL:  well developed, well nourished, in no apparent distress  SKIN:  no rashes , no suspicious lesions  HEENT: atraumatic.  TM's normal. Canals clear.  Pharynx normal without exudate.  EYES:  PERRL. Sclera anicteric.  NECK:  Supple,  no adenopathy,  thyroid normal  LUNGS:  clear to auscultation.  Effort normal  CARDIO:  RRR without murmur.   S1 and S2 normal  GI:  good BS's,   no masses,   HSM or tenderness  EXTREMITIES : no cyanosis, clubbing or edema    ASSESSMENT AND PLAN:     1. Annual physical exam  Annual physical exam.    2. Routine health maintenance  Discussed vaccines.  Flu vaccine today.    3. History of migraine  Under control with therapy.  Referred to neurology in her network.  Dr. Tapia.  - Neuro Referral - In Network    4. Acne vulgaris  Refer to dermatology in her network.  Dr. Bray  - Derm Referral - In Network    5. Encounter for gynecological examination without abnormal finding  Refer to gynecology in network  - OBG Referral - In Network    6. History of PCOS  Referred to gynecology in network  - OBG Referral - In Network    7. Flu vaccine need  Flu vaccine today  - INFLUENZA VACCINE, TRI, PRESERV FREE, 0.5 ML    This visit was 30 minutes.  I spent 10 minutes before visit preparing and reviewing old records.  Greater than 50% of the visit was engaged in counseling and review of past data.    Follow-up annual physical with PCP    The patient indicates understanding of these issues and agrees to the plan.    Mulugeta Pedroza MD  1/14/2025  9:48 AM

## 2025-01-25 ENCOUNTER — MOBILE ENCOUNTER (OUTPATIENT)
Dept: INTERNAL MEDICINE CLINIC | Facility: CLINIC | Age: 40
End: 2025-01-25

## 2025-01-25 RX ORDER — ONDANSETRON 4 MG/1
4 TABLET, ORALLY DISINTEGRATING ORAL EVERY 6 HOURS PRN
Qty: 30 TABLET | Refills: 0 | Status: SHIPPED | OUTPATIENT
Start: 2025-01-25

## 2025-02-07 ENCOUNTER — TELEPHONE (OUTPATIENT)
Dept: INTERNAL MEDICINE CLINIC | Facility: CLINIC | Age: 40
End: 2025-02-07

## 2025-02-07 DIAGNOSIS — R05.1 ACUTE COUGH: Primary | ICD-10-CM

## 2025-02-07 RX ORDER — AZITHROMYCIN 250 MG/1
TABLET, FILM COATED ORAL
Qty: 6 TABLET | Refills: 0 | Status: SHIPPED | OUTPATIENT
Start: 2025-02-07 | End: 2025-02-12

## 2025-02-07 NOTE — TELEPHONE ENCOUNTER
We can let Pricilla know the following    Usually the symptoms of coughing is usually a viral type infection although kind of unusual to get after she gets the norovirus.    2.    May not be unreasonable to get a chest x-ray.  I did place order.  I do not feel 100% certain that that is what she should get but it might be wise to get that before our visit.  If she does not want to get that she does not have to.    3.   If she feels like she is getting chest congestion from a cold then I did pend Zithromax Z-BRYCE she can start before we see each other.    4.   If she is feeling worse then she can also see the urgent care.  I prefer people to go to [facility] urgent care if she wishes to take that route before I can see her.

## 2025-02-07 NOTE — TELEPHONE ENCOUNTER
Called patient and relayed  message -verbalized understanding - wants z-dylan - to [facility]

## 2025-02-07 NOTE — TELEPHONE ENCOUNTER
Patient is calling 2 weeks ago patient came down with Norovirus.  She started to feel better then on 1/29 she developed sinus issues, she has cough with a burning in her chest when she coughs.    Patient is scheduled to see Dr Pedroza on 2/11.    What does he recommend?    Phone [phone]

## 2025-02-18 ENCOUNTER — TELEPHONE (OUTPATIENT)
Dept: INTERNAL MEDICINE CLINIC | Facility: CLINIC | Age: 40
End: 2025-02-18

## 2025-02-18 ENCOUNTER — HOSPITAL ENCOUNTER (OUTPATIENT)
Dept: GENERAL RADIOLOGY | Facility: HOSPITAL | Age: 40
Discharge: HOME | End: 2025-02-18
Attending: INTERNAL MEDICINE
Payer: COMMERCIAL

## 2025-02-18 DIAGNOSIS — R05.1 ACUTE COUGH: ICD-10-CM

## 2025-02-18 PROCEDURE — 71046 X-RAY EXAM CHEST 2 VIEWS: CPT | Performed by: INTERNAL MEDICINE

## 2025-02-18 NOTE — TELEPHONE ENCOUNTER
To Dr. LUNA     Spoke to patient, states she has been sick since January- had norovirus and a respiratory infection. Was prescribed z-pack by you two weeks ago and reports she finished course of antibiotics but is still not feeling well. States she is going to get chest x-ray today and would like to follow up and discuss further testing and treatment at upcoming visit

## 2025-02-18 NOTE — TELEPHONE ENCOUNTER
Please call Lakisha.  Please let her know one preparing for her visit Thursday.  I am just wondering briefly the reason for her visit as we just saw each other January 14.  She may have a new problem.

## 2025-02-20 ENCOUNTER — TELEPHONE (OUTPATIENT)
Dept: INTERNAL MEDICINE CLINIC | Facility: CLINIC | Age: 40
End: 2025-02-20

## 2025-02-20 ENCOUNTER — LAB ENCOUNTER (OUTPATIENT)
Dept: LAB | Age: 40
End: 2025-02-20
Attending: INTERNAL MEDICINE
Payer: COMMERCIAL

## 2025-02-20 ENCOUNTER — OFFICE VISIT (OUTPATIENT)
Dept: INTERNAL MEDICINE CLINIC | Facility: CLINIC | Age: 40
End: 2025-02-20

## 2025-02-20 VITALS
TEMPERATURE: 98 F | BODY MASS INDEX: 21.85 KG/M2 | HEART RATE: 74 BPM | OXYGEN SATURATION: 100 % | DIASTOLIC BLOOD PRESSURE: 66 MMHG | WEIGHT: 128 LBS | HEIGHT: 64 IN | SYSTOLIC BLOOD PRESSURE: 96 MMHG

## 2025-02-20 DIAGNOSIS — J06.9 UPPER RESPIRATORY TRACT INFECTION, UNSPECIFIED TYPE: ICD-10-CM

## 2025-02-20 DIAGNOSIS — R53.83 FATIGUE, UNSPECIFIED TYPE: ICD-10-CM

## 2025-02-20 DIAGNOSIS — A08.11 NOROVIRUS: ICD-10-CM

## 2025-02-20 DIAGNOSIS — Z86.69 HISTORY OF MIGRAINE: ICD-10-CM

## 2025-02-20 DIAGNOSIS — R53.83 FATIGUE, UNSPECIFIED TYPE: Primary | ICD-10-CM

## 2025-02-20 LAB
ALBUMIN SERPL-MCNC: 4.8 G/DL (ref 3.2–4.8)
ALBUMIN/GLOB SERPL: 2 {RATIO} (ref 1–2)
ALP LIVER SERPL-CCNC: 75 U/L
ALT SERPL-CCNC: 9 U/L
ANION GAP SERPL CALC-SCNC: 6 MMOL/L (ref 0–18)
AST SERPL-CCNC: 16 U/L (ref ?–34)
BASOPHILS # BLD AUTO: 0.03 X10(3) UL (ref 0–0.2)
BASOPHILS NFR BLD AUTO: 0.7 %
BILIRUB SERPL-MCNC: 0.7 MG/DL (ref 0.3–1.2)
BUN BLD-MCNC: 12 MG/DL (ref 9–23)
BUN/CREAT SERPL: 13.6 (ref 10–20)
CALCIUM BLD-MCNC: 9.6 MG/DL (ref 8.7–10.4)
CHLORIDE SERPL-SCNC: 106 MMOL/L (ref 98–112)
CO2 SERPL-SCNC: 27 MMOL/L (ref 21–32)
CREAT BLD-MCNC: 0.88 MG/DL
DEPRECATED RDW RBC AUTO: 37.8 FL (ref 35.1–46.3)
EGFRCR SERPLBLD CKD-EPI 2021: 86 ML/MIN/1.73M2 (ref 60–?)
EOSINOPHIL # BLD AUTO: 0.12 X10(3) UL (ref 0–0.7)
EOSINOPHIL NFR BLD AUTO: 3 %
ERYTHROCYTE [DISTWIDTH] IN BLOOD BY AUTOMATED COUNT: 11.9 % (ref 11–15)
FASTING STATUS PATIENT QL REPORTED: NO
GLOBULIN PLAS-MCNC: 2.4 G/DL (ref 2–3.5)
GLUCOSE BLD-MCNC: 87 MG/DL (ref 70–99)
HCT VFR BLD AUTO: 40.3 %
HGB BLD-MCNC: 13.8 G/DL
IMM GRANULOCYTES # BLD AUTO: 0.01 X10(3) UL (ref 0–1)
IMM GRANULOCYTES NFR BLD: 0.2 %
LYMPHOCYTES # BLD AUTO: 1.65 X10(3) UL (ref 1–4)
LYMPHOCYTES NFR BLD AUTO: 41.1 %
MCH RBC QN AUTO: 30.4 PG (ref 26–34)
MCHC RBC AUTO-ENTMCNC: 34.2 G/DL (ref 31–37)
MCV RBC AUTO: 88.8 FL
MONOCYTES # BLD AUTO: 0.36 X10(3) UL (ref 0.1–1)
MONOCYTES NFR BLD AUTO: 9 %
NEUTROPHILS # BLD AUTO: 1.84 X10 (3) UL (ref 1.5–7.7)
NEUTROPHILS # BLD AUTO: 1.84 X10(3) UL (ref 1.5–7.7)
NEUTROPHILS NFR BLD AUTO: 46 %
OSMOLALITY SERPL CALC.SUM OF ELEC: 287 MOSM/KG (ref 275–295)
PLATELET # BLD AUTO: 255 10(3)UL (ref 150–450)
POTASSIUM SERPL-SCNC: 4.6 MMOL/L (ref 3.5–5.1)
PROT SERPL-MCNC: 7.2 G/DL (ref 5.7–8.2)
RBC # BLD AUTO: 4.54 X10(6)UL
SODIUM SERPL-SCNC: 139 MMOL/L (ref 136–145)
TSI SER-ACNC: 2.07 UIU/ML (ref 0.55–4.78)
WBC # BLD AUTO: 4 X10(3) UL (ref 4–11)

## 2025-02-20 PROCEDURE — 84443 ASSAY THYROID STIM HORMONE: CPT

## 2025-02-20 PROCEDURE — 85025 COMPLETE CBC W/AUTO DIFF WBC: CPT

## 2025-02-20 PROCEDURE — 80053 COMPREHEN METABOLIC PANEL: CPT

## 2025-02-20 PROCEDURE — 36415 COLL VENOUS BLD VENIPUNCTURE: CPT

## 2025-02-20 NOTE — TELEPHONE ENCOUNTER
Maria Victoria Swensony got approval by her neurologist to stop her amitriptyline prescribed for her migraines but she is wondering if there is any weaning process that she should do just to be on the safe side.  I told her I would ask you.

## 2025-02-20 NOTE — PROGRESS NOTES
Pricilla Andersen is a 39 year old female.  HPI:     Chief Complaint   Patient presents with    Checkup     1 month, sick since Jan 24 with Norovirus then congestion and fatigue since Jan 31. CXR and Z-pack completed. Taking Sudafed.       Pricilla gives a history that around January 24 she came down with nausea vomiting and diarrhea.  This lasted 4 days.  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  She seems to have recovered from that.    Of her right after that around January 26 or so she developed head congestion and symptoms of a sinus infection and cough.    I did call in a Zithromax Z-BRYCE for her February 7.    A COVID test was not done.    She feels that she is better from her head congestion and coughing.    She does have nasal congestion with clear discharge but no yellow or green discharge.  No sore throat.  Her cough is generally nonproductive.  No shortness of breath.  No fevers or chills.    Main concern is extreme fatigue.  She is not had this amount of fatigue in her life.  She feels exhausted.    Her bowels are improved but still are somewhat loose at times.    Also wishes to come off her amitriptyline as she wonders if this is contributing to her fatigue.  She did get approval by her neurologist to stop the amitriptyline.  Will discuss with Leela Pharm.D. to see if there is any weaning process.  Current Outpatient Medications   Medication Sig Dispense Refill    ondansetron 4 MG Oral Tablet Dispersible Take 1 tablet (4 mg total) by mouth every 6 (six) hours as needed for Nausea. 30 tablet 0    UBRELVY 100 MG Oral Tab Take 100 mg by mouth as needed.      Tretinoin 0.025 % External Gel Apply 1 Application topically daily as needed.      amitriptyline 10 MG Oral Tab Take 1 tablet (10 mg total) by mouth nightly. 90 tablet 3    Clindamycin Phos-Benzoyl Perox 1-5 % External Gel clindamycin 1 %-benzoyl peroxide 5 % topical gel with pump  BRIANA  TOPICALLY AA BID 50 g 0    ZEMBRACE SYMTOUCH 3 MG/0.5ML Subcutaneous Solution Auto-injector Inject 0.5 mL into the skin as needed.      ALPRAZolam (XANAX) 0.25 MG Oral Tab Take 1 tablet (0.25 mg total) by mouth daily as needed for Anxiety. 30 tablet 0    albuterol (PROAIR HFA) 108 (90 Base) MCG/ACT Inhalation Aero Soln Inhale 2 puffs into the lungs every 6 (six) hours as needed for Wheezing. 1 each 11    ondansetron 4 MG Oral Tablet Dispersible 1-2 tabs as needed for nausea associated with migraine; no more than 6 tabs in 24 hours 30 tablet 1      Past Medical History:    Anxiety    Depression    H/O human papillomavirus infection    H/O spinal fusion    Hx of migraines    Scoliosis      Social History:  Social History     Socioeconomic History    Marital status: Single    Number of children: 0   Occupational History    Occupation:    Tobacco Use    Smoking status: Never    Smokeless tobacco: Never   Vaping Use    Vaping status: Never Used   Substance and Sexual Activity    Alcohol use: Not Currently     Alcohol/week: 2.0 - 3.0 standard drinks of alcohol     Types: 2 - 3 Glasses of wine per week     Comment: 3x/week    Drug use: No   Other Topics Concern    Caffeine Concern Yes     Comment: 1-2 cups pf coffee daily    Exercise Yes     Comment: walking twice a day    Right Handed Yes    Reaction to local anesthetic No        REVIEW OF SYSTEMS:   GENERAL HEALTH:  feels well otherwise prior to the clinical norovirus.  RESPIRATORY:  see above  CARDIOVASCULAR:  Voices no chest pain on exertion or shortness of breath  GI:   see above   :Viices no urning or frequency of urination.  NEURO:  Voices no  headaches or dizziness    EXAM:   BP 96/66   Pulse 74   Temp 97.7 °F (36.5 °C)   Ht 5' 4\" (1.626 m)   Wt 128 lb (58.1 kg)   LMP 01/13/2025   SpO2 100%   BMI 21.97 kg/m²     GENERAL:  well developed, well nourished, in no apparent distress but subjectively with extreme fatigue.  SKIN:  no rashes ,    HEENT: atraumatic.  TM's normal. Canals clear.  Pharynx normal without exudate.  EYES:  PERRL. Sclera anicteric.  NECK:  Supple,  no adenopathy,    LUNGS:  clear to auscultation.  Effort normal  CARDIO:  RRR without murmur.   S1 and S2 normal  GI:  good BS's,  no masses,   HSM or tenderness  EXTREMITIES : no cyanosis, clubbing or edema    ASSESSMENT AND PLAN:     1. Fatigue, unspecified type  Fatigue.  May be multifactorial.  May be from the norovirus complicated by the URI contributed to by the amitriptyline.  See above.  Will get basic labs.  Will inquire with pharmacy a weaning process for amitriptyline or just to stop it.  - CBC With Differential With Platelet; Future  - Comp Metabolic Panel (14); Future  - TSH W Reflex To Free T4; Future    2. Upper respiratory tract infection, unspecified type  I think improving.  Pricilla feels it is improving.  For now there is no need for additional antibiotics    3. Norovirus  Resolved.  Still with some loose bowels.    4. History of migraine  She has had contact with her neurologist and is establishing with Dr. Tapia in April.    This visit was 30 minutes.  I spent 10 minutes before visit preparing and reviewing old records.  Greater than 50% of the visit was engaged in counseling and review of past data.    I did tell her that I will be retiring in June 13, 2025.  I did give her resources for a website to call to find a new physician and a phone [phone] to find a new physician.  She verbalized understanding.     The patient indicates understanding of these issues and agrees to the plan.    Mulugeta Pedroza MD  2/20/2025  9:23 AM

## 2025-02-21 ENCOUNTER — TELEPHONE (OUTPATIENT)
Dept: INTERNAL MEDICINE CLINIC | Facility: CLINIC | Age: 40
End: 2025-02-21

## 2025-02-25 ENCOUNTER — LAB ENCOUNTER (OUTPATIENT)
Dept: LAB | Age: 40
End: 2025-02-25
Attending: STUDENT IN AN ORGANIZED HEALTH CARE EDUCATION/TRAINING PROGRAM
Payer: COMMERCIAL

## 2025-02-25 ENCOUNTER — OFFICE VISIT (OUTPATIENT)
Dept: DERMATOLOGY CLINIC | Facility: CLINIC | Age: 40
End: 2025-02-25

## 2025-02-25 ENCOUNTER — TELEPHONE (OUTPATIENT)
Dept: DERMATOLOGY CLINIC | Facility: CLINIC | Age: 40
End: 2025-02-25

## 2025-02-25 DIAGNOSIS — L70.0 ACNE VULGARIS: ICD-10-CM

## 2025-02-25 DIAGNOSIS — Z51.81 MEDICATION MONITORING ENCOUNTER: ICD-10-CM

## 2025-02-25 DIAGNOSIS — L70.0 ACNE VULGARIS: Primary | ICD-10-CM

## 2025-02-25 LAB — B-HCG UR QL: NEGATIVE

## 2025-02-25 PROCEDURE — 99204 OFFICE O/P NEW MOD 45 MIN: CPT | Performed by: STUDENT IN AN ORGANIZED HEALTH CARE EDUCATION/TRAINING PROGRAM

## 2025-02-25 PROCEDURE — 81025 URINE PREGNANCY TEST: CPT

## 2025-02-25 RX ORDER — SEGESTERONE ACETATE AND ETHINYL ESTRADIOL 103; 17.4 MG/1; MG/1
1 RING VAGINAL ONCE
Qty: 1 EACH | Refills: 0 | Status: SHIPPED | OUTPATIENT
Start: 2025-02-25 | End: 2025-02-25

## 2025-02-25 NOTE — PROGRESS NOTES
New patient     Referred by:   Mulugeta Pedroza MD  [address] [place] [phone]     CHIEF COMPLAINT: Acne vulgaris     HISTORY OF PRESENT ILLNESS: .    1. Acne  Location: Face and neck    Duration: 25 years  Bleeding, growing, changing: No  Scaly:No    Itchy:No    Current treatment: Birth control, Clindamycin Phos-Benzoyl Perox 1-5 % External Gel, Tretinoin 0.025 % External Gel   Past treatments: Same as above      DERM HISTORY:  History of skin cancer: No  History of chronic skin disease/condition: No    FAMILY HISTORY:  History of melanoma: No    History/Other:    REVIEW OF SYSTEMS:  Constitutional: Denies fever, chills, unintentional weight loss.   Skin as per HPI    PAST MEDICAL HISTORY:  Past Medical History:    Anxiety    Depression    H/O human papillomavirus infection    H/O spinal fusion    Hx of migraines    Scoliosis       Medications  Current Outpatient Medications   Medication Sig Dispense Refill    ondansetron 4 MG Oral Tablet Dispersible Take 1 tablet (4 mg total) by mouth every 6 (six) hours as needed for Nausea. 30 tablet 0    UBRELVY 100 MG Oral Tab Take 100 mg by mouth as needed.      Tretinoin 0.025 % External Gel Apply 1 Application topically daily as needed.      Clindamycin Phos-Benzoyl Perox 1-5 % External Gel clindamycin 1 %-benzoyl peroxide 5 % topical gel with pump  BRIANA TOPICALLY AA BID 50 g 0    ZEMBRACE SYMTOUCH 3 MG/0.5ML Subcutaneous Solution Auto-injector Inject 0.5 mL into the skin as needed.      ALPRAZolam (XANAX) 0.25 MG Oral Tab Take 1 tablet (0.25 mg total) by mouth daily as needed for Anxiety. 30 tablet 0    albuterol (PROAIR HFA) 108 (90 Base) MCG/ACT Inhalation Aero Soln Inhale 2 puffs into the lungs every 6 (six) hours as needed for Wheezing. 1 each 11    ondansetron 4 MG Oral Tablet Dispersible 1-2 tabs as needed for nausea associated with migraine; no more than 6 tabs in 24 hours 30 tablet 1    amitriptyline 10 MG Oral Tab Take 1 tablet (10 mg total) by mouth  nightly. (Patient not taking: Reported on 2/25/2025) 90 tablet 3       Objective:    PHYSICAL EXAM:  General: awake, alert, no acute distress  Skin: Skin exam was performed today including the following: face and neck Pertinent findings include:   - face with numerous erythematous papules    ASSESSMENT & PLAN:  Pathophysiology of diagnoses discussed with patient.  Therapeutic options reviewed. Risks, benefits, and alternatives discussed with patient. Instructions reviewed at length.    #Acne Vulgaris, nodulocystic - patient desires low dose Accutane therapy. Will attempt to do pregnanacy tests in lab every 3 month and rest at home. Ring and  male latex condoms 2 forms of BC.   - Given patient has failed topical medications and oral medications, isotretinoin is recommended at this point. Reviewed alternative systemic treatment options. Discussed that without systemic therapy, risks include irreversible scarring of affected areas.  - Pending blood work, will start isotretinoin 20 mg once daily with fatty meal. Will plan to continue at this dose.   - Discussed patient cannot become pregnant, breast feed, or donate blood while on the medication and for 1 month after stopping. Discussed that patient cannot share medication.  - Labs today qualitative HCG  - Qualitative HCG to be repeated at least one month after initial testing and within the first 5 days of menstrual cycle. If pregnancy test negative at that time, script for isotretinoin to be sent to pharmacy and patient to be confirmed in iPledge.    I discussed at length the issues of isotretnoin therapy including goal of treatment, medication dosage, duration of therapy, and side effects, as well as the alternative to care.     Patient/parent denies personal/family hx of IBD  Patient/parent denies personal/family hx of depression/suicide    Reviewed adverse effects including those that are common and not serious, such as dry skin (xerosis) with/without retinoid  dermatitis, dry lips (cheilitis), dry nose (xeromycteria) with/without epistaxis, dry eyes (xerophthalmia), irritation of contact lenses, dyslipidemia (increase in cholesterol and triglycerides), phototoxicity, possible exacerbation/flaring of acne vulgaris in the first 4 to 6 weeks of therapy, and arthralgias/myalgias (muscle aches and joint pains), as well as those that are rare, but serious, such as pseudotumor cerebri, major depressive disorder, suicidal ideation, hepatotoxicity, pancreatitis, teratogenicity (females), anemia/leukopenia, changes to night vision, and possible unmasking or exacerbation of inflammatory bowel disease.    The patient/parent understand there is a possibility of acne recurrence; however, acne is often easier to treat if it recurs after isotretinoin therapy. Patient agrees to not give blood during treatment and for 1 month after treatment. Patient agrees not to share medication. Patient agrees remain on chosen forms of contraception while on the medication and continue 1 month after stopping. If there are any adverse events/symptoms including depression, thoughts of suicide, diarrhea, abdominal pain or cramping, blood in stool, or other concerning side effects, patient will contact us or head directly to ED for immediate evaluation. Patient informed to stop all other acne medications while on isotretinoin. Informed patient they can take Ibuprofen or NSAIDs for joint aches. Instructed to avoid Tylenol and alcohol due to increased risk of liver toxicity while on isotretinoin. Questions were answered, Patient/parent expressed understanding of the risks, benefits, alternatives and guidelines and would like to proceed with isotretinoin therapy. Counseling and consents signed today and iPledge booklet given to patient/parent.      Return to clinic: 1 month      Elio Bray MD    By signing my name below, Bria AGUILA MA,  attest that this documentation has been prepared under the  direction and in the presence of Elio Bray MD.   Electronically Signed: Bria KNIGHT MA, 2/25/2025, 9:31 AM.    I, Elio Bray MD,  personally performed the services described in this documentation. All medical record entries made by the scribe were at my direction and in my presence.  I have reviewed the chart and agree that the record reflects my personal performance and is accurate and complete.  Elio Bray MD, 2/25/2025, 11:45 AM

## 2025-02-25 NOTE — TELEPHONE ENCOUNTER
Patient Name: Pricilla Andersen   Street Address: [address]    State: [place]  Zip Code: [address]  Telephone #: [phone]  E-Mail Address: [email or web address]  Preferred Method of Contact (e-mail or text): Email     Date Consent Signed: 2/25/25    Childbearing Potential Patients  Primary Contraceptive: Vaginal Ring   Secondary Contraceptive: Condoms

## 2025-02-27 ENCOUNTER — PATIENT MESSAGE (OUTPATIENT)
Dept: DERMATOLOGY CLINIC | Facility: CLINIC | Age: 40
End: 2025-02-27

## 2025-03-04 RX ORDER — NORGESTIMATE AND ETHINYL ESTRADIOL 0.25-0.035
1 KIT ORAL DAILY
Qty: 84 TABLET | Refills: 3 | Status: SHIPPED | OUTPATIENT
Start: 2025-03-04 | End: 2026-03-04

## 2025-03-07 ENCOUNTER — MED REC SCAN ONLY (OUTPATIENT)
Dept: DERMATOLOGY CLINIC | Facility: CLINIC | Age: 40
End: 2025-03-07

## 2025-03-15 ENCOUNTER — HOSPITAL ENCOUNTER (OUTPATIENT)
Age: 40
Discharge: HOME | End: 2025-03-15
Payer: COMMERCIAL

## 2025-03-15 VITALS
DIASTOLIC BLOOD PRESSURE: 87 MMHG | HEART RATE: 74 BPM | OXYGEN SATURATION: 100 % | TEMPERATURE: 98 F | RESPIRATION RATE: 18 BRPM | SYSTOLIC BLOOD PRESSURE: 111 MMHG

## 2025-03-15 DIAGNOSIS — M79.675 PAIN OF LEFT GREAT TOE: ICD-10-CM

## 2025-03-15 DIAGNOSIS — L03.019 ONYCHIA AND PARONYCHIA OF FINGER: Primary | ICD-10-CM

## 2025-03-15 RX ORDER — MUPIROCIN 20 MG/G
1 OINTMENT TOPICAL 2 TIMES DAILY
Qty: 1 G | Refills: 0 | Status: SHIPPED | OUTPATIENT
Start: 2025-03-15 | End: 2025-03-25

## 2025-03-15 RX ORDER — CEPHALEXIN 500 MG/1
500 CAPSULE ORAL 3 TIMES DAILY
Qty: 21 CAPSULE | Refills: 0 | Status: SHIPPED | OUTPATIENT
Start: 2025-03-15 | End: 2025-03-22

## 2025-03-15 NOTE — DISCHARGE INSTRUCTIONS
FOLLOW PRESCRIPTION INSTRUCTION. TAKE PROBIOTIC WITH ANTIBIOTIC AND CONTINUE 1 WEEK AFTER COURSE. PHARMACIST WILL PROVIDE RECOMMENDATION.     READDRESS WITH PODIATRY IF NOT IMPROVING OVER WEEK AHEAD.

## 2025-03-15 NOTE — ED INITIAL ASSESSMENT (HPI)
Pt c/o redness to L great toe and R 2nd toe x2 weeks progressively worsening with pain.  No injury.  No drainage.  No fever.

## 2025-03-15 NOTE — ED PROVIDER NOTES
Chief Complaint   Patient presents with    Toe Pain       HPI:     Pricilla Andersen is a 39 year old female who presents for evaluation of of wound along the right second toe over the last 2 weeks, denies injury or trauma.  Denies previous skin issues including MRSA, patient is not a diabetic.  Patient notes taking over-the-counter as well as a family members prescription nystatin triamcinolone without improvement.  Notes nontraumatic pain to the contralateral foot great toe over the last few days without injury or trauma, weightbearing without issue.  Denies any antipyretics since onset, afebrile on arrival.  Denies numbness or tingling purulent drainage.      PFSH    PFSH asessment screens reviewed and agree.  Nurses notes reviewed I agree with documentation.    Family History   Problem Relation Age of Onset    Other (Cancer glioblastoma) Father         CLL     Family history reviewed with patient/caregiver and is not pertinent to presenting problem.  Social History     Socioeconomic History    Marital status: Single     Spouse name: Not on file    Number of children: 0    Years of education: Not on file    Highest education level: Not on file   Occupational History    Occupation:    Tobacco Use    Smoking status: Never    Smokeless tobacco: Never   Vaping Use    Vaping status: Never Used   Substance and Sexual Activity    Alcohol use: Not Currently     Comment: occ    Drug use: No    Sexual activity: Not on file   Other Topics Concern     Service Not Asked    Blood Transfusions Not Asked    Caffeine Concern Yes     Comment: 1-2 cups pf coffee daily    Occupational Exposure Not Asked    Hobby Hazards Not Asked    Sleep Concern Not Asked    Stress Concern Not Asked    Weight Concern Not Asked    Special Diet Not Asked    Back Care Not Asked    Exercise Yes     Comment: walking twice a day    Bike Helmet Not Asked    Seat Belt Not Asked    Self-Exams Not Asked    Left Handed Not  Asked    Right Handed Yes    Currently spends a great deal of time in the sun Not Asked    Past Sunlamp Treatments for Acne Not Asked    History of tanning Not Asked    Hx of Spending Great Deal of Time in Sun Not Asked    Bad sunburns in the past Not Asked    Tanning Salons in the Past Not Asked    Hx of Radiation Treatments Not Asked    Regular use of sun block Not Asked    Grew up on a farm Not Asked    Outdoor occupation Not Asked    Breast feeding Not Asked    Reaction to local anesthetic No   Social History Narrative    Not on file     Social Drivers of Health     Food Insecurity: Not on file   Transportation Needs: Not on file   Housing Stability: Not on file         ROS:   Positive for stated complaint: Toe pain.  All other systems reviewed and negative except as noted above.  Constitutional and Vital Signs Reviewed.      Physical Exam:     Findings:    /87   Pulse 74   Temp 98 °F (36.7 °C) (Oral)   Resp 18   LMP 03/08/2025 (Approximate)   SpO2 100%   GENERAL: well developed, well nourished, well hydrated, no distress  SKIN: good skin turgor, no obvious rashes  EXTREMITIES: Wound along the right second toe medial aspect adjacent to the cuticle.  No induration fluctuance localized erythema.  No dehiscence, nailbed intact.  No direct tenderness erythema or warmth along the left great toe.  DP pulse cap refill and distal sensation intact.  No cyanosis or edema. CARBAJAL without difficulty  HEAD: normocephalic, atraumatic  EYES: sclera non icteric bilateral, conjunctiva clear  NEURO: No focal deficits  PSYCH: Alert and oriented x3.  Answering questions appropriately.  Mood appropriate.    MDM/Assessment/Plan:   Orders for this encounter:    Orders Placed This Encounter    mupirocin 2 % External Ointment     Sig: Apply 1 Application topically 2 (two) times daily for 10 days.     Dispense:  1 g     Refill:  0    cephALEXin 500 MG Oral Cap     Sig: Take 1 capsule (500 mg total) by mouth 3 (three) times daily  for 7 days.     Dispense:  21 capsule     Refill:  0       Labs performed this visit:  No results found for this or any previous visit (from the past 10 hours).    MDM:  Patient agrees empiric coverage for topical and systemic antibiotic based on presentation history suggestive of paronychia.  Patient contralateral toe without clinical recommendation for radiographs based on history and encounter yet will readdress with podiatry if worsening or lingering issues bilaterally.  Happy with plan of care.    Diagnosis:    ICD-10-CM    1. Onychia and paronychia of finger  L03.019       2. Pain of left great toe  M79.675           All results reviewed and discussed with patient.  See AVS for detailed discharge instructions for your condition today.    Follow Up with:  Mulugeta Pedroza MD  [address] [place] [phone] [phone]    Schedule an appointment as soon as possible for a visit in 1 week  As needed, If symptoms worsen    Yvon Garcia DPSYEDA  [address] [address]  [phone]    Schedule an appointment as soon as possible for a visit in 1 week  As needed, If symptoms worsen PODIATRY

## 2025-03-20 ENCOUNTER — PATIENT MESSAGE (OUTPATIENT)
Dept: INTERNAL MEDICINE CLINIC | Facility: CLINIC | Age: 40
End: 2025-03-20

## 2025-03-20 DIAGNOSIS — L03.031 CELLULITIS OF SECOND TOE, RIGHT: Primary | ICD-10-CM

## 2025-03-27 ENCOUNTER — OFFICE VISIT (OUTPATIENT)
Dept: PODIATRY CLINIC | Facility: CLINIC | Age: 40
End: 2025-03-27
Payer: COMMERCIAL

## 2025-03-27 DIAGNOSIS — L08.9 BLISTER OF TOE OF RIGHT FOOT WITH INFECTION, INITIAL ENCOUNTER: Primary | ICD-10-CM

## 2025-03-27 DIAGNOSIS — S90.424A BLISTER OF TOE OF RIGHT FOOT WITH INFECTION, INITIAL ENCOUNTER: Primary | ICD-10-CM

## 2025-03-27 PROCEDURE — 99203 OFFICE O/P NEW LOW 30 MIN: CPT | Performed by: PODIATRIST

## 2025-03-27 NOTE — PROGRESS NOTES
Pricilla Andersen is a 39 year old female.   Chief Complaint   Patient presents with    Toe Pain     Right foot 2 digit- looks infected left side- swollen and red- was seen in urgent care with oral antibiotics- not any better- mild pain         HPI:   Patient presents to the clinic she has a complaint of her second toe looks infected.  Its mostly on the left side swollen and red he was seen in urgent care and placed on Keflex which she has finished and she has been applying mupirocin ointment and she thinks it looks better and feels better when it is covered up.  At today's visit reviewed nurse's history as taken above, allergies medications and medical history as documented below.  All changes duly noted  Allergies: Sulfa antibiotics   Current Outpatient Medications   Medication Sig Dispense Refill    amoxicillin clavulanate 875-125 MG Oral Tab Take 1 tablet by mouth 2 (two) times daily. 14 tablet 0    Norgestimate-Eth Estradiol (SPRINTEC 28) 0.25-35 MG-MCG Oral Tab Take 1 tablet by mouth daily. 84 tablet 3    ondansetron 4 MG Oral Tablet Dispersible Take 1 tablet (4 mg total) by mouth every 6 (six) hours as needed for Nausea. 30 tablet 0    UBRELVY 100 MG Oral Tab Take 100 mg by mouth as needed.      Tretinoin 0.025 % External Gel Apply 1 Application topically daily as needed.      amitriptyline 10 MG Oral Tab Take 1 tablet (10 mg total) by mouth nightly. 90 tablet 3    Clindamycin Phos-Benzoyl Perox 1-5 % External Gel clindamycin 1 %-benzoyl peroxide 5 % topical gel with pump  BRIANA TOPICALLY AA BID 50 g 0    ZEMBRACE SYMTOUCH 3 MG/0.5ML Subcutaneous Solution Auto-injector Inject 0.5 mL into the skin as needed.      ALPRAZolam (XANAX) 0.25 MG Oral Tab Take 1 tablet (0.25 mg total) by mouth daily as needed for Anxiety. 30 tablet 0    albuterol (PROAIR HFA) 108 (90 Base) MCG/ACT Inhalation Aero Soln Inhale 2 puffs into the lungs every 6 (six) hours as needed for Wheezing. 1 each 11    ondansetron 4 MG Oral  Tablet Dispersible 1-2 tabs as needed for nausea associated with migraine; no more than 6 tabs in 24 hours 30 tablet 1      Past Medical History:    Anxiety    Depression    H/O human papillomavirus infection    H/O spinal fusion    Hx of migraines    Scoliosis      Past Surgical History:   Procedure Laterality Date    Spinal fusion      thoracic lumbar with rods      Family History   Problem Relation Age of Onset    Other (Cancer glioblastoma) Father         CLL      Social History     Socioeconomic History    Marital status: Single    Number of children: 0   Occupational History    Occupation:    Tobacco Use    Smoking status: Never    Smokeless tobacco: Never   Vaping Use    Vaping status: Never Used   Substance and Sexual Activity    Alcohol use: Not Currently     Comment: occ    Drug use: No   Other Topics Concern    Caffeine Concern Yes     Comment: 1-2 cups pf coffee daily    Exercise Yes     Comment: walking twice a day    Right Handed Yes    Reaction to local anesthetic No           REVIEW OF SYSTEMS:   Today reviewed systens as documented below  GENERAL HEALTH: feels well otherwise  SKIN: Refer to exam below  RESPIRATORY: denies shortness of breath with exertion  CARDIOVASCULAR: denies chest pain on exertion  GI: denies abdominal pain and denies heartburn  NEURO: denies headaches    EXAM:   LMP 03/08/2025 (Approximate)   GENERAL: well developed, well nourished, in no apparent distress  EXTREMITIES:   1. Integument: The skin on her right foot was evaluated she has a small hyperkeratosis at the distal medial tip of the second toe which looks like it could be rubbing from too short of the shoe.  There is a small blister formed there.   2. Vascular: Patient has palpable pulses dorsalis pedis posterior tibial on the right   3. Neurologic: Patient has intact sensorium   4. Musculoskeletal: Patient has good muscle strength.    ASSESSMENT AND PLAN:   Diagnoses and all orders for this  visit:    Blister of toe of right foot with infection, initial encounter    Other orders  -     Discontinue: amoxicillin clavulanate 875-125 MG Oral Tab; Take 1 tablet by mouth 2 (two) times daily.  -     amoxicillin clavulanate 875-125 MG Oral Tab; Take 1 tablet by mouth 2 (two) times daily.        Plan: At today's office visit I trimmed down debrided blister there was mild hemorrhaging treated with Lumicain which controlled it applied antibiotic ointment and a Band-Aid she will soak it twice daily in warm soapy water apply mupirocin ointment and a Band-Aid I placed her on 1 week of Augmentin and I will see her again in 2 weeks for checkup but sooner if required.    The patient indicates understanding of these issues and agrees to the plan.    Yvon Garcia DPM

## 2025-03-31 ENCOUNTER — PATIENT MESSAGE (OUTPATIENT)
Dept: DERMATOLOGY CLINIC | Facility: CLINIC | Age: 40
End: 2025-03-31

## 2025-03-31 DIAGNOSIS — L70.0 ACNE VULGARIS: Primary | ICD-10-CM

## 2025-04-01 NOTE — TELEPHONE ENCOUNTER
Please see below msg, Dr. Bray's pt - she is due for her 2nd HCG in order to start accutane now - she will be on Augmentin at that time - I believe she cannot take both - please advise on this. I think she should do the HCG/ Accutane but not take until done with augmentin in order to remain on track here with ipledge but please confirm - thank you!

## 2025-04-01 NOTE — TELEPHONE ENCOUNTER
Checked with DM. She may be on Augmentin and accutane together. She can do 2nd pregnancy test and then  Accutane and start.

## 2025-04-02 ENCOUNTER — LAB ENCOUNTER (OUTPATIENT)
Dept: LAB | Facility: HOSPITAL | Age: 40
End: 2025-04-02
Attending: STUDENT IN AN ORGANIZED HEALTH CARE EDUCATION/TRAINING PROGRAM
Payer: COMMERCIAL

## 2025-04-02 ENCOUNTER — OFFICE VISIT (OUTPATIENT)
Dept: NEUROLOGY | Facility: CLINIC | Age: 40
End: 2025-04-02
Payer: COMMERCIAL

## 2025-04-02 DIAGNOSIS — L70.0 ACNE VULGARIS: ICD-10-CM

## 2025-04-02 DIAGNOSIS — G43.009 MIGRAINE WITHOUT AURA AND WITHOUT STATUS MIGRAINOSUS, NOT INTRACTABLE: Primary | ICD-10-CM

## 2025-04-02 DIAGNOSIS — Z51.81 MEDICATION MONITORING ENCOUNTER: ICD-10-CM

## 2025-04-02 PROBLEM — L70.9 ACNE: Status: ACTIVE | Noted: 2020-08-26

## 2025-04-02 LAB — B-HCG UR QL: NEGATIVE

## 2025-04-02 PROCEDURE — 99204 OFFICE O/P NEW MOD 45 MIN: CPT | Performed by: OTHER

## 2025-04-02 PROCEDURE — 81025 URINE PREGNANCY TEST: CPT

## 2025-04-02 RX ORDER — UBROGEPANT 100 MG/1
TABLET ORAL
Qty: 10 TABLET | Refills: 11 | Status: SHIPPED | OUTPATIENT
Start: 2025-04-02 | End: 2026-04-02

## 2025-04-02 RX ORDER — SEGESTERONE ACETATE AND ETHINYL ESTRADIOL 103; 17.4 MG/1; MG/1
RING VAGINAL
COMMUNITY
Start: 2025-02-25

## 2025-04-02 RX ORDER — SUMATRIPTAN 3 MG/1
0.5 INJECTION, SOLUTION SUBCUTANEOUS AS NEEDED
Qty: 0.5 ML | Refills: 5 | Status: SHIPPED | OUTPATIENT
Start: 2025-04-02

## 2025-04-02 NOTE — PROGRESS NOTES
[facility] [address], [address]  [facility] [address]  [phone]              Date: April 2, 2025  Patient Name: Pricilla Andersen   MRN: [id number]    Reason for Evaluation: Migraines     HPI:     Pricilla Andersen is a 39 year old woman with past medical history of migraine headaches, anxiety and depression who presents to establish care regarding her migraines.    Prior medication trials include amitriptyline, Ubrelvy, injectable sumatriptan and and Zofran.  She is on Sprintec.  She has also tried intranasal formulations of triptans but the taste of these medications triggered worsening nausea, which she already has from her migraines.    In 2016 she saw a neurologist here.  During that office visit she described onset of migraines since age 11.  Food triggers including MSG.  Sharp bifrontal severe pain with photophobia, phonophobia, nausea, sometimes vomiting.  No auras.  Triggers could include end of menstrual cycle, alcohol, stress, poor sleep.  Had used Imitrex, Tylenol 3, rizatriptan.  Went to see neurology because she had an intractable migraine that did not respond to her typical rescue medication Maxalt.  She was given a prescription for sumatriptan/naproxen and Zofran at that time.    Last year, the patient had severe stress with anxiety and depression in part related to work, and tried Prozac.  This helped a lot with her mood but unfortunately triggered an increase in her migraine frequency.  She went from about 1 to 2/year to at least 1/week, sometimes more.  She changed her Prozac to venlafaxine at 37.5 mg dose but did not increase this medication further.  She did not feel it helped that much with her migraines.    Over time, she has managed her stress and is planning on quitting her job.  Her migraine frequency is improved to the point where she is getting maybe 1 a month now.  She finds very good relief with injectable sumatriptan and Ubrelvy.   She will only usually use the injectable sumatriptan if her migraine is quite severe upon awakening, greater than 5/10.  Otherwise she uses Ubrelvy instead.    She was taking amitriptyline as a preventative but found this made her quite exhausted.  She had seen a neurologist at [facility] last year that prescribed this.  She also had an MRI brain done.  This was overall unremarkable.    Migraine characteristics are bilateral/frontal, can be holocephalic, greater than 4 hours, quite severe, throbbing, osmophobia, photophobia, phonophobia, nausea.    She is finding Sprintec is helping with her cystic acne and menstrual cycles.  She does have a headache now because she is on her week free from Sprintec.    There is no aura with her migraines.    OUTPATIENT MEDICATIONS  Medications Ordered Prior to Encounter[1]    MEDICAL HISTORY  Past Medical History:    Anxiety    Depression    H/O human papillomavirus infection    H/O spinal fusion    Hx of migraines    Scoliosis       SURGICAL HISTORY  Past Surgical History:   Procedure Laterality Date    Spinal fusion      thoracic lumbar with rods       SOCIAL HISTORY  Social History     Socioeconomic History    Marital status: Single    Number of children: 0   Occupational History    Occupation:    Tobacco Use    Smoking status: Never    Smokeless tobacco: Never   Vaping Use    Vaping status: Never Used   Substance and Sexual Activity    Alcohol use: Not Currently     Comment: occ    Drug use: No   Other Topics Concern    Caffeine Concern Yes     Comment: 1-2 cups pf coffee daily    Exercise Yes     Comment: walking twice a day    Right Handed Yes    Reaction to local anesthetic No       FAMILY HISTORY  Family History   Problem Relation Age of Onset    Other (Cancer glioblastoma) Father         CLL       ALLERGIES  Allergies[2]    REVIEW OF SYSTEMS:   13-point review of systems was done and is negative unless otherwise stated in HPI.     PHYSICAL EXAM:     LMP 03/08/2025  (Approximate)   General appearance: Well appearing, alert and in no acute distress  Skin: skin color normal.  No rashes or lesions.    Head: Normocephalic, atraumatic.    Neurological:  Mental Status: Alert, oriented to situation/normal fund of knowledge, Follows commands, and Speech fluent and appropriate.  Cranial Nerves: PERRL, visual fields intact to confrontation, extraocular movements intact, facial sensation intact, face symmetric, no facial droop or ptosis, normal bedside auditory acuity, no dysarthria  Motor: muscle strength 5/5 both upper and lower extremities  Reflexes: UE and LE reflexes are equal and reactive  Sensation: intact to light touch  Coordination: Finger-to- nose-finger intact bilaterally   Gait: normal-based.    LABS/DATA:  CBC and CMP were reviewed  She had a negative pregnancy test in February    IMAGING:  MRI as stated in HPI    ASSESSMENT:  The patient is a 39 year old woman with past medical history of migraine headaches, anxiety and depression who presents to establish care regarding her migraines.    Migraine headaches  -Preventative: She is off amitriptyline due to infrequency of migraines and intolerance (excessive sedation).  We did discuss Cefaly, magnesium, riboflavin.  Also discussed SNRIs in detail and Atogepant   Future considerations: Venlafaxine up titration to 150 mg (or lower doses if effective for mood and migraine), duloxetine, desvenlafaxine, Atogepant  -Abortive: Ubrelvy or injectable sumatriptan.  Not to combine these within 24 hours.   Future considerations: Zofran   Limit non-CGRP rescue medications <2 times per week to avoid developing medication overuse headaches   -Neuroimaging: MRI brain was completed  -Yearly optometry/ophthalmology dilated eye exam for ocular health     -Follow up: 3 to 6 months  -Lifestyle information provided    -Patient will let my office know if she becomes pregnant or plan to become pregnant so we can adjust/stop medication safely        Discussed indication, administration, dose, and side effects with patient of any medications personally prescribed. Patient was advised to let my office know if they have any questions or concerns.       Today, I personally spent 31 minutes in this case, including chart review, time spent with patient doing face to face evaluation w/ interview and exam and patient education, counseling, and time was spent in patient education, counseling, and coordination of care as described above.   Issues discussed: Diagnosis and implications on future health, benefits and side effects of present and future medications, test results as well as further testing and medications required.    This note was prepared using Dragon Medical voice recognition dictation software and as a result, errors may occur. When identified, these errors have been corrected. While every attempt is made to correct errors during dictation, discrepancies may still exist    ANTONIA Tapia DO   Staff Neurologist   4/2/2025  9:10 AM                     [1]   Current Outpatient Medications on File Prior to Visit   Medication Sig Dispense Refill    ANNOVERA 0.013-0.15 MG/24HR Vaginal Ring PLACE 1 APPLICATION VAGINALLY ONE TIME FOR 1 DOSE.      amoxicillin clavulanate 875-125 MG Oral Tab Take 1 tablet by mouth 2 (two) times daily. 14 tablet 0    Norgestimate-Eth Estradiol (SPRINTEC 28) 0.25-35 MG-MCG Oral Tab Take 1 tablet by mouth daily. 84 tablet 3    ondansetron 4 MG Oral Tablet Dispersible Take 1 tablet (4 mg total) by mouth every 6 (six) hours as needed for Nausea. 30 tablet 0    UBRELVY 100 MG Oral Tab Take 100 mg by mouth as needed.      Tretinoin 0.025 % External Gel Apply 1 Application topically daily as needed.      amitriptyline 10 MG Oral Tab Take 1 tablet (10 mg total) by mouth nightly. 90 tablet 3    Clindamycin Phos-Benzoyl Perox 1-5 % External Gel clindamycin 1 %-benzoyl peroxide 5 % topical gel with pump  BRIANA TOPICALLY AA BID 50 g 0    ZEMBRACE  SYMTOUCH 3 MG/0.5ML Subcutaneous Solution Auto-injector Inject 0.5 mL into the skin as needed.      ALPRAZolam (XANAX) 0.25 MG Oral Tab Take 1 tablet (0.25 mg total) by mouth daily as needed for Anxiety. 30 tablet 0    albuterol (PROAIR HFA) 108 (90 Base) MCG/ACT Inhalation Aero Soln Inhale 2 puffs into the lungs every 6 (six) hours as needed for Wheezing. 1 each 11    ondansetron 4 MG Oral Tablet Dispersible 1-2 tabs as needed for nausea associated with migraine; no more than 6 tabs in 24 hours 30 tablet 1     No current facility-administered medications on file prior to visit.   [2]   Allergies  Allergen Reactions    Sulfa Antibiotics RASH and UNKNOWN     Substance with sulfonamide structure and antibacterial mechanism of action (substance)

## 2025-04-02 NOTE — PATIENT INSTRUCTIONS
Cefaly device - can be used as a preventative and rescue.  This is a TENS unit that works in her trigeminal nerve.  It is usually out-of-pocket.  Please let me know if you are interested and I am happy to write a prescription for you.    Medication options to help with mood and migraine: Venlafaxine up titration to 150 mg daily, sometimes patients will have improvement at even 75 mg or 112.5 mg so we do not necessarily need to go up 250 mg daily.  Other options include duloxetine with a maximum dose of 60 mg daily or desvenlafaxine.  If you look up these medications there are a lot of information about how it is difficult to wean off the medications.  Sometimes we cross-taper these medications to longer acting antidepressants to help to avoid any unpleasant withdrawal symptoms.      Other migraine specific preventatives include Atogepant/Quilipta. This works like Ubrelvy but as a preventive that you take daily.    HEADACHE / MIGRAINE LIFESTYLE INFORMATION     Headache Preventive Treatment:   Please keep in mind that it takes 4-6 weeks for the medication to start working well and 2-3 months at the appropriate dose before deciding if it will be useful or not. If it is not helping at all by this time, then we will discuss other medications to try. Supplements may take 3-6 months until you see full effect.     Natural supplements:  Magnesium 400 mg at bedtime    Magnesium oxide is good for constipation; otherwise you can try magnesium glycinate   Vitamin B2- 400mg in the morning     Vitamins and herbs that show potential    Magnesium: Magnesium (400 mg at bedtime) has a relaxant effect on smooth muscles such as blood vessels. Individuals suffering from frequent or daily headache usually have low magnesium levels which can be increase with daily supplementation of 400-750 mg. Three trials found 40-90% average headache reduction  when used as a preventative. Magnesium also demonstrated the benefit in menstrually related  migraine.  Magnesium is part of the messenger system in the serotonin cascade and it is a good muscle relaxant.  It is also useful for constipation which can be a side effect of other medications used to treat migraine. Good sources include nuts, whole grains, and tomatoes.    Riboflavin (vitamin B 2) 400 mg in the morning. This vitamin assists nerve cells in the production of ATP a principal energy storing molecule.  It is necessary for many chemical reactions in the body.  There have been at least 3 clinical trials of riboflavin using 400 mg per day all of which suggested that migraine frequency can be decreased.  All 3 trials showed significant improvement in over half of migraine sufferers.  The supplement is found in bread, cereal, milk, meat, and poultry.  Most Americans get more riboflavin than the recommended daily allowance, however riboflavin deficiency is not necessary for the supplements to help prevent headache.    Melatonin: Increasing evidence shows correlation between melatonin secretion and headache conditions.  Melatonin supplementation has decreased headache intensity and duration.  It is widely used as a sleep aid.  Sleep is natures way of dealing with migraine.  A dose of 3 mg is recommended to start for headaches including cluster headache. Higher doses up to 15 mg has been reviewed for use in Cluster headache and have been used.  The rationale behind using melatonin for cluster is that many theories regarding the cause of Cluster headache center around the disruption of the normal circadian rhythm in the brain.  This helps restore the normal circadian rhythm.    Lin: Lin has a small amount of antihistamine and anti-inflammatory action which may help headache.  It is primarily used for nausea and may aid in the absorption of other medications.    HEADACHE DIET: Foods and beverages which may trigger migraine  Note that only 20% of headache patients are food sensitive. You will know if you  are food sensitive if you get a headache consistently 20 minutes to 2 hours after eating a certain food. Only cut out a food if it causes headaches, otherwise you might remove foods you enjoy! What matters most for diet is to eat a well balanced healthy diet full of vegetables and low fat protein, and to not miss meals.    Chocolate, other sweets    ALL cheeses except cottage and cream cheese    Dairy products, yogurt, sour cream, ice cream    Liver    Meat extracts (Bovril, Marmite, meat tenderizers)    Meats or fish which have undergone aging, fermenting, pickling or smoking. These include: Hotdogs,salami,Lox,sausage,  mortadellas,smoked salmon, pepperoni, Pickled herring    Pods of broad bean (English beans, Chinese pea pods, Italian (apolinar) beans, lima and navy beans    Ripe avocado, ripe banana    Yeast extracts or active yeast preparations such as Crockett's or Malathi's (commercial bakes goods are permitted)    Tomato based foods, pizza (lasagna, etc.)    MSG (monosodium glutamate) is disguised as many things; look for these common aliases:  Monopotassium glutamate  Autolysed yeast  Hydrolysed protein  Sodium caseinate  “flavorings”  “all natural preservatives\"    Nutrasweet    Avoid all other foods that convincingly provoke headaches.    Headache Prevention Strategies:    1. Maintain a headache diary; learn to identify and avoid triggers. Common triggers include:    Emotional triggers:  Emotional/Upset family or friends  Emotional/Upset occupation  Business reversal/success  Anticipation anxiety  Crisis-serious  Post-crisis periodNew job/position     Physical triggers:  Vacation Day  Weekend  Strenuous Exercise  High Altitude Location  New Move  Menstrual Day  Physical Illness  Oversleep/Not enough sleep  Weather changes  Light: Photophobia or light sesnitivity treatment involves a balance between desensitization and reduction in overly strong input. Use dark polarized glasses outside, but not inside. Avoid  bright or fluorescent light, but do not dim environment to the point that going into a normally lit room hurts. Consider FL-41 tint lenses, which reduce the most irritating wavelengths without blocking too much light.  These can be obtained at [employer] or [employer]  Foods: see list above.    2. Limit use of acute treatments (over-the-counter medications, triptans, etc.) to no more than 2 days per week or 10 days per month to prevent medication overuse headache (rebound headache).      3. Follow a regular schedule (including weekends and holidays):  Don't skip meals. Eat a balanced diet.  8 hours of sleep nightly.  Minimize stress.  Exercise 30 minutes per day.   Keep well hydrated and drink 6-8 glasses of water per day.    4. Initiate non-pharmacologic measures at the earliest onset of your headache.  Rest and quiet environment.  Relax and reduce stress.   Cold compresses.    5. Don't wait!! Take the maximum allowable dosage of prescribed medication at the first sign of migraine.    6. Compliance:  Take prescribed medication regularly as directed and at the first sign of a migraine.    7. Communicate:  Call your physician when problems arise, especially if your headaches change, increase in frequency/severity, or become associated with neurological symptoms (weakness, numbness, slurred speech, etc.).    8. Headache/pain management therapies: Consider various complementary methods, including medication, behavioral therapy, psychological counselling, biofeedback, massage therapy, acupuncture, dry needling, and other modalities.  Such measures may reduce the need for medications. Counseling for pain management, where patients learn to function and ignore/minimize their pain, seems to work very well.    9. Recommend changing family's attention and focus away from patient's headaches. Instead, emphasize daily activities. If first question of day is 'How are your headaches/Do you have a headache today?', then  patient will constantly think about headaches, thus making them worse. Goal is to re-direct attention away from headaches, toward daily activities and other distractions.    10. Helpful Websites:  www.AmericanHeadacheSociety.org  www.migrainetrust.org  www.headaches.org  www.migraine.org.uk  www.achenet.org    11. HEADACHE EXPECTATIONS:  There are many types of headaches, and only a rare few in which complete relief can be expected.  In general, there is no cure for headache, especially migraine based headaches.  There is nothing available that completely prevents headaches from occurring, breaking through, or having periodic flare-ups and fluctuations.  Regardless of what you are using on a daily basis for prevention, episodic headaches should still be expected, and periods where frequency may escalate and fluctuate are unavoidable.     There is no quick fix for most headaches.  Furthermore, the longer you have had high frequency headaches (such as chronic daily headache), the longer it will likely take to expect any improvement.  In fact, some people will never improve, regardless of how many medications or other treatments we try.  Our treatment strategy is to evaluate for possible causes of your headache, although testing is usually always normal, even in cases of daily continuous headaches for years.  Most types of headache such as migraine are electrical brain disorders (similar to how epilepsy is an electrical brain disorders).  Therefore, there is no testing that will reveal this \"dysfunctional electrical circuitry\" such on MRI, or other testing.  We try to find a medication that may help lessen the frequency and/or severity of your headaches.  The goal is not to completely stop them from happening, although if that happens, great!  Different people respond to different medications, and some people just don't respond to anything, so it's usually a matter of trying different options.  We can not predict if or  when exactly you will respond to a treatment that we provide.     Preventive headache medications take 4-6 weeks to start working, and 2-3 months to see full effect, assuming you reach an effective dose.  Our recommendation will generally be to give it adequate time first.  If you are unable to wait it out for medications to work, we can also try IV infusions for some temporary relief.       In general, the best that preventive medications or other treatments (including Botox) are able to offer in migraine management (variable in other headache types) is a 50% improvement in frequency and/or severity of headache.  That is our goal, and any additional benefit is considered a bonus.  Some people do significantly better than this, others do not get close to this.  Therefore, if your headaches are not improving by at least 3 months on your preventive strategy, contact us and we can discuss further adjustments.  Keep in mind that complete headache cure is not a realistic expectation.    Our Team:  The nursing staff, and medical assistants are a major part of your treatment team and will be handling your phone calls and inquiries, if any.   Unless explicitly told otherwise at the time of your office visit, your study results and ensuing treatment plans will be released via [employer] and discussed during your follow-up appointment.     [employer]: Please ask the schedulers to give you an activation code. The main way of communication is by [employer] rather than phone lines, so if you have not signed up, please do so. [employer] is also the way that you can review your labs and testing.  If you have any questions about the results, you are free to message us.     [employer] is meant for simple questions regarding medications, possible side effects, or other simple straight forward questions in limited sentences, rather than multiple paragraphs of discussion.  [employer] is not meant for, or efficient for these complex questions, extensive  questions, extensive medication adjustments, complex new symptoms or concerns.  These issues beyond simple questions require a follow up visit with myself,    Refills:  Please pay attention to when your refills will need to be renewed. Due to the volume of phone calls daily, this could potentially take a few days, although we certainly try to honor your refill requests as soon as we can.  You should call at least 1 week in advance of needing a refill to ensure you do not run out of medication.  Keep in mind that refill requests on Fridays may not be filled until the following week.

## 2025-04-03 NOTE — PROGRESS NOTES
Pt confirmed in iPledge.  Patient can obtain their prescription from:  April 02, 2025 - April 08, 2025 (7 - Day Prescription window).  Awaiting verbal order from DM.

## 2025-04-04 ENCOUNTER — PATIENT MESSAGE (OUTPATIENT)
Dept: DERMATOLOGY CLINIC | Facility: CLINIC | Age: 40
End: 2025-04-04

## 2025-04-04 ENCOUNTER — TELEPHONE (OUTPATIENT)
Dept: NEUROLOGY | Facility: CLINIC | Age: 40
End: 2025-04-04

## 2025-04-04 RX ORDER — ISOTRETINOIN 20 MG/1
20 CAPSULE ORAL DAILY
Qty: 30 CAPSULE | Refills: 0 | Status: SHIPPED | OUTPATIENT
Start: 2025-04-04

## 2025-04-04 NOTE — TELEPHONE ENCOUNTER
Rcvd fax from [facility] requesting alternative for Zembrace Symtouch 3mg/0.5ml   Advising medication is not covered by insurance.  To Consider changing to one of suggested covered alternatives.

## 2025-04-04 NOTE — TELEPHONE ENCOUNTER
Prior authorization approved  Payer: [place] [employer] Case ID: [id number]    [phone]    [phone]  Note from payer: Your PA request has been approved.  Additional information will be provided in the approval communication. (Message 1146)  Approval Details    Authorized from April 4, 2025 to April 4, 2026

## 2025-04-04 NOTE — PROGRESS NOTES
Per DM, ok to send first rx for accutane.  20 mg isotretinoin, once daily (per office visit notes).  MyChart sent to pt.

## 2025-04-07 ENCOUNTER — TELEPHONE (OUTPATIENT)
Dept: DERMATOLOGY CLINIC | Facility: CLINIC | Age: 40
End: 2025-04-07

## 2025-04-16 ENCOUNTER — PATIENT MESSAGE (OUTPATIENT)
Dept: NEUROLOGY | Facility: CLINIC | Age: 40
End: 2025-04-16

## 2025-04-17 RX ORDER — SUMATRIPTAN 6 MG/.5ML
6 INJECTION, SOLUTION SUBCUTANEOUS AS DIRECTED
Qty: 8 EACH | Refills: 3 | Status: SHIPPED | OUTPATIENT
Start: 2025-04-17

## 2025-04-17 RX ORDER — VENLAFAXINE HYDROCHLORIDE 150 MG/1
150 CAPSULE, EXTENDED RELEASE ORAL DAILY
Qty: 90 CAPSULE | Refills: 0 | Status: SHIPPED | OUTPATIENT
Start: 2025-04-17 | End: 2025-07-16

## 2025-04-17 RX ORDER — VENLAFAXINE HYDROCHLORIDE 37.5 MG/1
CAPSULE, EXTENDED RELEASE ORAL
Qty: 42 CAPSULE | Refills: 0 | Status: SHIPPED | OUTPATIENT
Start: 2025-04-17 | End: 2025-05-08

## 2025-05-05 ENCOUNTER — OFFICE VISIT (OUTPATIENT)
Dept: SURGERY | Facility: CLINIC | Age: 40
End: 2025-05-05
Payer: COMMERCIAL

## 2025-05-05 VITALS — BODY MASS INDEX: 23.05 KG/M2 | WEIGHT: 135 LBS | HEIGHT: 64 IN

## 2025-05-05 DIAGNOSIS — Z01.419 WOMEN'S ANNUAL ROUTINE GYNECOLOGICAL EXAMINATION: Primary | ICD-10-CM

## 2025-05-05 DIAGNOSIS — F41.9 ANXIETY: ICD-10-CM

## 2025-05-05 DIAGNOSIS — Z30.41 SURVEILLANCE OF CONTRACEPTIVE PILL: ICD-10-CM

## 2025-05-05 PROCEDURE — 88175 CYTOPATH C/V AUTO FLUID REDO: CPT | Performed by: OBSTETRICS & GYNECOLOGY

## 2025-05-05 PROCEDURE — 87624 HPV HI-RISK TYP POOLED RSLT: CPT | Performed by: OBSTETRICS & GYNECOLOGY

## 2025-05-05 PROCEDURE — 81514 NFCT DS BV&VAGINITIS DNA ALG: CPT | Performed by: OBSTETRICS & GYNECOLOGY

## 2025-05-05 PROCEDURE — 87591 N.GONORRHOEAE DNA AMP PROB: CPT | Performed by: OBSTETRICS & GYNECOLOGY

## 2025-05-05 PROCEDURE — 87491 CHLMYD TRACH DNA AMP PROBE: CPT | Performed by: OBSTETRICS & GYNECOLOGY

## 2025-05-05 RX ORDER — NORGESTIMATE AND ETHINYL ESTRADIOL 0.25-0.035
1 KIT ORAL DAILY
Qty: 84 TABLET | Refills: 3 | Status: SHIPPED | OUTPATIENT
Start: 2025-05-05 | End: 2026-05-05

## 2025-05-05 NOTE — PROGRESS NOTES
NEW GYN H&P     2025  9:38 AM    Chief Complaint   Patient presents with    New Patient    Annual     Std testing    .    HPI: Patient is a 39 year old  LMP 2325 here to establish care - due for annual exam with PAP - requesting STD screen and OCP refill. Cycles and cystic acne well controlled on pill but experiences side effect of anxiety and interested tools for management that do not involve prescription medications. Discussed trial with herbal supplement Ashwagandha and also consultation for Reiki services. No other gynecologic concerns or complaints.No pelvic pain. No abnormal vaginal discharge or bleeding.         Patient's last menstrual period was 2025 (approximate).    OB History    Para Term  AB Living   0 0 0 0 0 0   SAB IAB Ectopic Multiple Live Births   0 0 0 0 0       GYN hx:    Hx Prior Abnormal Pap: Yes  Pap Date: 22  Pap Result Notes: normal  CONTRACEPTION: OCP  LAST MAMMOGRAM: N/A      Current Medications[1]    Past Medical History[2]  Past Surgical History[3]  Allergies[4]  Family History[5]  Set as collapsible by default.[6]  Social History     Social History Narrative    Not on file       ROS:     Review of Systems:  A comprehensive 10 point ROS was completed. All pertinent positives and negatives noted in the HPI.     Ht 64\"   Wt 135 lb (61.2 kg)   LMP 2025 (Approximate)   BMI 23.17 kg/m²     Exam:   GENERAL: well developed, well nourished, in no apparent distress  SKIN: no rashes, no lesions  HEENT: normal  LUNGS: respiration unlabored  CARDIOVASCULAR: no peripheral edema or varicosities, skin warm and dry  BREASTS: bilaterally nontender, no palpable masses, no nipple discharge, no skin changes, no axillary adenopathy  ABDOMEN: Soft, non distended; non tender, no masses  GYNE/:   External Genitalia: normal, no lesions, good perineal support  Urethra: meatus normal   Bladder: well supported  Vagina: normal mucosa, no lesions, mucoid discharge  cultured  Uterus: normal size, mobile, nontender  Cervix: normal os, no lesions or bleeding  Adnexa:normal size, bilaterally nontender, no palpable masses  Cul-de-sac: normal  R/V: normal perineum, no hemorrhoids  EXTREMITIES:  nontender without edema      A/P: Patient is 39 year old female     1. Women's annual routine gynecological examination  - PAP today  - STD screening done    2. Surveillance of contraceptive pill  - Refill done    3. Anxiety  - Start Ashwagandha (Sarah Herbs) 300 mg twice daily  - Referral placed for Reiki      Total time spent = 35 minutes  >50% visit = face to face counseling and coordination of care        5/5/2025  Ly Morillo MD                    [1]   Current Outpatient Medications   Medication Sig Dispense Refill    SUMAtriptan 6 MG/0.5ML Subcutaneous Solution Inject 0.5 mL (6 mg total) into the skin As Directed for 30 doses. Use at onset; may repeat once after 2 hours- ONLY 2 IN 24 HOUR PERIOD MAX 8 each 3    ubrogepant (UBRELVY) 100 MG Oral Tab Take one tablet at onset of migraine.  May take additional tablet in 2 hours if needed.  Do not exceed two tablets per 24 hour period. 10 tablet 11    Norgestimate-Eth Estradiol (SPRINTEC 28) 0.25-35 MG-MCG Oral Tab Take 1 tablet by mouth daily. 84 tablet 3    ondansetron 4 MG Oral Tablet Dispersible Take 1 tablet (4 mg total) by mouth every 6 (six) hours as needed for Nausea. 30 tablet 0    Tretinoin 0.025 % External Gel Apply 1 Application topically daily as needed.      Clindamycin Phos-Benzoyl Perox 1-5 % External Gel clindamycin 1 %-benzoyl peroxide 5 % topical gel with pump  BRIANA TOPICALLY AA BID 50 g 0    ALPRAZolam (XANAX) 0.25 MG Oral Tab Take 1 tablet (0.25 mg total) by mouth daily as needed for Anxiety. 30 tablet 0    albuterol (PROAIR HFA) 108 (90 Base) MCG/ACT Inhalation Aero Soln Inhale 2 puffs into the lungs every 6 (six) hours as needed for Wheezing. 1 each 11   [2]   Past Medical History:   Anxiety    Depression     Dysmenorrhea    Dyspareunia    H/O human papillomavirus infection    H/O spinal fusion    Hx of migraines    Migraine headache without aura    PCOS (polycystic ovarian syndrome)    Scoliosis   [3]   Past Surgical History:  Procedure Laterality Date    Colposcopy, cervix w/upper adjacent vagina; w/biopsy(s), cervix      Insert intrauterine device      Remove intrauterine device      Spinal fusion      thoracic lumbar with rods   [4]   Allergies  Allergen Reactions    Sulfa Antibiotics RASH and UNKNOWN     Substance with sulfonamide structure and antibacterial mechanism of action (substance)   [5]   Family History  Problem Relation Age of Onset    Other (Cancer glioblastoma) Father         CLL    Cancer Father         Gbm   [6]   Social History  Socioeconomic History    Marital status: Single    Number of children: 0   Occupational History    Occupation:    Tobacco Use    Smoking status: Never    Smokeless tobacco: Never   Vaping Use    Vaping status: Never Used   Substance and Sexual Activity    Alcohol use: Yes     Alcohol/week: 2.0 - 3.0 standard drinks of alcohol     Types: 2 - 3 Glasses of wine per week     Comment: occ    Drug use: No

## 2025-05-06 ENCOUNTER — TELEPHONE (OUTPATIENT)
Dept: OBGYN CLINIC | Facility: CLINIC | Age: 40
End: 2025-05-06

## 2025-05-06 LAB
C TRACH DNA SPEC QL NAA+PROBE: NEGATIVE
HPV E6+E7 MRNA CVX QL NAA+PROBE: NEGATIVE
N GONORRHOEA DNA SPEC QL NAA+PROBE: NEGATIVE

## 2025-05-06 NOTE — TELEPHONE ENCOUNTER
Called lab, two swabs placed in one tube can not use for vaginosis screen but will continue with thin prep, sti and HPV, informed will route to Dr. Morillo.      Ly Morillo MD to Me (Selected Message)        5/6/25  1:49 PM  Not sure how that happened but please apologize to patient and offer her an appointment to do the yeast and BV swab.  Thanks,  EB    Called pt informed, believes she does not need retesting for yeast/BV.

## 2025-05-06 NOTE — TELEPHONE ENCOUNTER
Ronit with [facility] lab called stating patient had GC & BV swab ib 1 container. Lab unable to run test due to both swabs being in the same container. Per Kacy ACOSTA request, GC will be run off the Thin prep, but patient will have to return for BV .

## 2025-05-13 RX ORDER — VENLAFAXINE HYDROCHLORIDE 37.5 MG/1
CAPSULE, EXTENDED RELEASE ORAL
Qty: 42 CAPSULE | Refills: 0 | OUTPATIENT
Start: 2025-05-13

## 2025-05-22 ENCOUNTER — PATIENT MESSAGE (OUTPATIENT)
Dept: SURGERY | Facility: CLINIC | Age: 40
End: 2025-05-22

## 2025-05-27 NOTE — TELEPHONE ENCOUNTER
Ly Morillo MD to Me (Selected Message)        5/23/25 12:56 PM  I'm unaware of canker sores being a side effect of OCPs but can certainly change her Rx and see if they improve on another formulation. Could be from inert ingredient in the generic pill? If she wants a new Rx, do LoOvral #90 ANNMARIE Velásquez

## 2025-06-13 ENCOUNTER — TELEPHONE (OUTPATIENT)
Dept: OBGYN CLINIC | Facility: CLINIC | Age: 40
End: 2025-06-13

## 2025-06-13 NOTE — TELEPHONE ENCOUNTER
Overdue Results    Order Name Placed Expected Extend Order Cancel Order Order Details   HEPATITIS B SURFACE ANTIGEN 5/5/25 5/5/25  Extend  Cancel  Order Details   HIV AG AB COMBO 5/5/25 5/5/25  Extend  Cancel  Order Details   T PALLIDUM SCREENING CASCADE          Per overdue result list in epic pt due for above lab test. [employer] message sent to pt.

## 2025-07-17 RX ORDER — VENLAFAXINE HYDROCHLORIDE 150 MG/1
150 CAPSULE, EXTENDED RELEASE ORAL DAILY
Qty: 90 CAPSULE | Refills: 0 | Status: SHIPPED | OUTPATIENT
Start: 2025-07-17

## 2025-07-17 NOTE — TELEPHONE ENCOUNTER
The original prescription was discontinued on 5/5/2025 by Ly Morillo MD for the following reason: Patient discontinued. Renewing this prescription may not be appropriate.       The patient is requesting a refill on: VENLAFAXINE HCL  MG CAP     [place]): Venlafaxine HCl     Dispensed Written Strength Quantity Refills Days Supply Provider Pharmacy   VENLAFAXINE HCL  MG CAP 04/17/2025 04/17/2025  90 each  90 Melia Tapia,  [facility]/pharmacy #[id number] - [place]...   VENLAFAXINE HCL ER 37.5 MG CAP 04/17/2025 04/17/2025  42 each  21 Melia Tapia, DO [facility]/pharmacy #[id number] - [place]...       Last OV: 4/2/25  Next OV: none schedule  No future appointments.   ASSESSMENT:  The patient is a 39 year old woman with past medical history of migraine headaches, anxiety and depression who presents to establish care regarding her migraines.     Migraine headaches  -Preventative: She is off amitriptyline due to infrequency of migraines and intolerance (excessive sedation).  We did discuss Cefaly, magnesium, riboflavin.  Also discussed SNRIs in detail and Atogepant   Future considerations: Venlafaxine up titration to 150 mg (or lower doses if effective for mood and migraine), duloxetine, desvenlafaxine, Atogepant  -Abortive: Ubrelvy or injectable sumatriptan.  Not to combine these within 24 hours.               Future considerations: Zofran               Limit non-CGRP rescue medications <2 times per week to avoid developing medication overuse headaches   -Neuroimaging: MRI brain was completed  -Yearly optometry/ophthalmology dilated eye exam for ocular health     -Follow up: 3 to 6 months  -Lifestyle information provided    -Patient will let my office know if she becomes pregnant or plan to become pregnant so we can adjust/stop medication safely     Instructions      Return in about 3 months (around 7/2/2025).  Cefaly device - can be used as a preventative and rescue.  This is a TENS unit that  works in her trigeminal nerve.  It is usually out-of-pocket.  Please let me know if you are interested and I am happy to write a prescription for you.     Medication options to help with mood and migraine: Venlafaxine up titration to 150 mg daily, sometimes patients will have improvement at even 75 mg or 112.5 mg so we do not necessarily need to go up 250 mg daily.  Other options include duloxetine with a maximum dose of 60 mg daily or desvenlafaxine.  If you look up these medications there are a lot of information about how it is difficult to wean off the medications.  Sometimes we cross-taper these medications to longer acting antidepressants to help to avoid any unpleasant withdrawal symptoms.       Other migraine specific preventatives include Atogepant/Quilipta. This works like Ubrelvy but as a preventive that you take daily.

## 2025-08-11 ENCOUNTER — PATIENT MESSAGE (OUTPATIENT)
Dept: SURGERY | Facility: CLINIC | Age: 40
End: 2025-08-11

## 2025-08-11 DIAGNOSIS — Z01.419 WOMEN'S ANNUAL ROUTINE GYNECOLOGICAL EXAMINATION: ICD-10-CM

## 2025-08-12 RX ORDER — NORGESTIMATE AND ETHINYL ESTRADIOL 0.25-0.035
1 KIT ORAL DAILY
Qty: 84 TABLET | Refills: 2 | Status: SHIPPED | OUTPATIENT
Start: 2025-08-12 | End: 2026-08-12

## (undated) NOTE — MR AVS SNAPSHOT
[facility] [address] [address]  [phone]               Thank you for choosing us for your health care visit with Asad Patel DO. We are glad to serve you and happy to provide you with this summary of your visit.   Please he Take 1 tablet by mouth every 6 (six) hours as needed for congestion.            Sumatriptan-Naproxen Sodium  MG Tabs   1 tablet at onset of migraine, may repeat in 1 hour; no more than 2 tablets in 24 hours   Commonly known as:  TREXIMET           HERNANDEZ

## (undated) NOTE — ED AVS SNAPSHOT
Pasquale Bundy   MRN: [id number]    Department:  [facility]   Date of Visit:  9/1/2019           Disclosure     Insurance plans vary and the physician(s) referred by the ER may not be covered by your plan.  Please conta CARE PHYSICIAN AT ONCE OR RETURN IMMEDIATELY TO THE EMERGENCY DEPARTMENT. If you have been prescribed any medication(s), please fill your prescription right away and begin taking the medication(s) as directed.   If you believe that any of the medications

## (undated) NOTE — LETTER
18      Patient: Catalino Cates  : 12/10/1985 Visit date: 2018    Dear Alma Stark,      I examined your patient in consultation today. She has a resolving lesion of the left distal leg, which may be posttraumatic.   Today the

## (undated) NOTE — MR AVS SNAPSHOT
[place]  [address] [place] [id number]  [phone]               Thank you for choosing us for your health care visit with Chin Couch DO. We are glad to serve you and happy to provide you with this summary of your visit.   Please he schedule your appointment. Failure to obtain required authorization numbers can create reimbursement difficulties for you.         To Jorge Rivera    Functional Status questions complete?:      Assoc Dx:  Depression, unspecified depression type [F32.9] Start 1 tablet daily x 10 days, then increase to 2 tablets daily. Commonly known as:  EFFEXOR XR           VESTURA 3-0.02 MG Tabs   Generic drug:  Drospirenone-Ethinyl Estradiol   Take 1 tablet by mouth daily.                 Where to Get Your Medications